# Patient Record
Sex: FEMALE | Race: WHITE | NOT HISPANIC OR LATINO | Employment: OTHER | URBAN - METROPOLITAN AREA
[De-identification: names, ages, dates, MRNs, and addresses within clinical notes are randomized per-mention and may not be internally consistent; named-entity substitution may affect disease eponyms.]

---

## 2022-08-01 ENCOUNTER — LAB REQUISITION (OUTPATIENT)
Dept: LAB | Facility: CLINIC | Age: 67
End: 2022-08-01
Payer: COMMERCIAL

## 2022-08-03 LAB
BASOPHILS # BLD AUTO: 0 10E3/UL (ref 0–0.2)
BASOPHILS NFR BLD AUTO: 0 %
EOSINOPHIL # BLD AUTO: 0.2 10E3/UL (ref 0–0.7)
EOSINOPHIL NFR BLD AUTO: 2 %
ERYTHROCYTE [DISTWIDTH] IN BLOOD BY AUTOMATED COUNT: 15.2 % (ref 10–15)
HCT VFR BLD AUTO: 38.8 % (ref 35–47)
HGB BLD-MCNC: 11.6 G/DL (ref 11.7–15.7)
IMM GRANULOCYTES # BLD: 0.1 10E3/UL
IMM GRANULOCYTES NFR BLD: 1 %
LYMPHOCYTES # BLD AUTO: 4.2 10E3/UL (ref 0.8–5.3)
LYMPHOCYTES NFR BLD AUTO: 36 %
MCH RBC QN AUTO: 27.1 PG (ref 26.5–33)
MCHC RBC AUTO-ENTMCNC: 29.9 G/DL (ref 31.5–36.5)
MCV RBC AUTO: 91 FL (ref 78–100)
MONOCYTES # BLD AUTO: 0.8 10E3/UL (ref 0–1.3)
MONOCYTES NFR BLD AUTO: 7 %
NEUTROPHILS # BLD AUTO: 6.3 10E3/UL (ref 1.6–8.3)
NEUTROPHILS NFR BLD AUTO: 54 %
NRBC # BLD AUTO: 0 10E3/UL
NRBC BLD AUTO-RTO: 0 /100
NT-PROBNP SERPL-MCNC: 339 PG/ML (ref 0–125)
PLATELET # BLD AUTO: 298 10E3/UL (ref 150–450)
RBC # BLD AUTO: 4.28 10E6/UL (ref 3.8–5.2)
WBC # BLD AUTO: 11.6 10E3/UL (ref 4–11)

## 2022-08-03 PROCEDURE — 36415 COLL VENOUS BLD VENIPUNCTURE: CPT | Mod: ORL | Performed by: NURSE PRACTITIONER

## 2022-08-03 PROCEDURE — 83880 ASSAY OF NATRIURETIC PEPTIDE: CPT | Mod: ORL | Performed by: NURSE PRACTITIONER

## 2022-08-03 PROCEDURE — P9603 ONE-WAY ALLOW PRORATED MILES: HCPCS | Mod: ORL | Performed by: NURSE PRACTITIONER

## 2022-08-03 PROCEDURE — 85025 COMPLETE CBC W/AUTO DIFF WBC: CPT | Mod: ORL | Performed by: NURSE PRACTITIONER

## 2022-08-11 ENCOUNTER — LAB REQUISITION (OUTPATIENT)
Dept: LAB | Facility: CLINIC | Age: 67
End: 2022-08-11
Payer: COMMERCIAL

## 2022-08-11 DIAGNOSIS — I61.5: ICD-10-CM

## 2022-08-11 DIAGNOSIS — E11.9 TYPE 2 DIABETES MELLITUS WITHOUT COMPLICATIONS (H): ICD-10-CM

## 2022-08-11 DIAGNOSIS — D64.9 ANEMIA, UNSPECIFIED: ICD-10-CM

## 2022-08-15 LAB
HBA1C MFR BLD: 6 %
IRON SERPL-MCNC: 37 UG/DL (ref 35–180)
TRANSFERRIN SERPL-MCNC: 191 MG/DL (ref 200–360)

## 2022-08-15 PROCEDURE — 36415 COLL VENOUS BLD VENIPUNCTURE: CPT | Mod: ORL | Performed by: GENERAL ACUTE CARE HOSPITAL

## 2022-08-15 PROCEDURE — 83540 ASSAY OF IRON: CPT | Mod: ORL | Performed by: GENERAL ACUTE CARE HOSPITAL

## 2022-08-15 PROCEDURE — P9603 ONE-WAY ALLOW PRORATED MILES: HCPCS | Mod: ORL | Performed by: GENERAL ACUTE CARE HOSPITAL

## 2022-08-15 PROCEDURE — 84466 ASSAY OF TRANSFERRIN: CPT | Mod: ORL | Performed by: GENERAL ACUTE CARE HOSPITAL

## 2022-08-15 PROCEDURE — 83036 HEMOGLOBIN GLYCOSYLATED A1C: CPT | Mod: ORL | Performed by: GENERAL ACUTE CARE HOSPITAL

## 2022-11-07 ENCOUNTER — LAB REQUISITION (OUTPATIENT)
Dept: LAB | Facility: CLINIC | Age: 67
End: 2022-11-07
Payer: COMMERCIAL

## 2022-11-07 DIAGNOSIS — S31.109D UNSPECIFIED OPEN WOUND OF ABDOMINAL WALL, UNSPECIFIED QUADRANT WITHOUT PENETRATION INTO PERITONEAL CAVITY, SUBSEQUENT ENCOUNTER: ICD-10-CM

## 2022-11-07 LAB
GRAM STAIN RESULT: NORMAL
GRAM STAIN RESULT: NORMAL

## 2022-11-07 PROCEDURE — 87077 CULTURE AEROBIC IDENTIFY: CPT | Mod: ORL | Performed by: PHYSICIAN ASSISTANT

## 2022-11-07 PROCEDURE — 87205 SMEAR GRAM STAIN: CPT | Mod: ORL | Performed by: PHYSICIAN ASSISTANT

## 2022-11-08 ENCOUNTER — HOSPITAL ENCOUNTER (EMERGENCY)
Facility: CLINIC | Age: 67
Discharge: HOME | End: 2022-11-08
Attending: EMERGENCY MEDICINE | Admitting: EMERGENCY MEDICINE
Payer: COMMERCIAL

## 2022-11-08 VITALS
SYSTOLIC BLOOD PRESSURE: 107 MMHG | HEIGHT: 67 IN | TEMPERATURE: 98.7 F | WEIGHT: 260 LBS | HEART RATE: 69 BPM | DIASTOLIC BLOOD PRESSURE: 72 MMHG | OXYGEN SATURATION: 95 % | RESPIRATION RATE: 18 BRPM | BODY MASS INDEX: 40.81 KG/M2

## 2022-11-08 DIAGNOSIS — R23.8 SKIN BREAKDOWN: ICD-10-CM

## 2022-11-08 DIAGNOSIS — K46.9 NON-RECURRENT ABDOMINAL HERNIA WITHOUT OBSTRUCTION OR GANGRENE, UNSPECIFIED HERNIA TYPE: ICD-10-CM

## 2022-11-08 LAB
ANION GAP SERPL CALCULATED.3IONS-SCNC: 7 MMOL/L (ref 3–14)
BUN SERPL-MCNC: 11 MG/DL (ref 7–30)
CALCIUM SERPL-MCNC: 9.3 MG/DL (ref 8.5–10.1)
CHLORIDE BLD-SCNC: 108 MMOL/L (ref 94–109)
CO2 SERPL-SCNC: 24 MMOL/L (ref 20–32)
CREAT SERPL-MCNC: 0.56 MG/DL (ref 0.52–1.04)
CRP SERPL-MCNC: 7.1 MG/L (ref 0–8)
ERYTHROCYTE [DISTWIDTH] IN BLOOD BY AUTOMATED COUNT: 14 % (ref 10–15)
ERYTHROCYTE [SEDIMENTATION RATE] IN BLOOD BY WESTERGREN METHOD: 20 MM/HR (ref 0–30)
GFR SERPL CREATININE-BSD FRML MDRD: >90 ML/MIN/1.73M2
GLUCOSE BLD-MCNC: 119 MG/DL (ref 70–99)
HCT VFR BLD AUTO: 42.5 % (ref 35–47)
HGB BLD-MCNC: 13.2 G/DL (ref 11.7–15.7)
LACTATE SERPL-SCNC: 1.3 MMOL/L (ref 0.7–2)
MCH RBC QN AUTO: 26.5 PG (ref 26.5–33)
MCHC RBC AUTO-ENTMCNC: 31.1 G/DL (ref 31.5–36.5)
MCV RBC AUTO: 85 FL (ref 78–100)
PLATELET # BLD AUTO: 231 10E3/UL (ref 150–450)
POTASSIUM BLD-SCNC: 4.7 MMOL/L (ref 3.4–5.3)
RBC # BLD AUTO: 4.99 10E6/UL (ref 3.8–5.2)
SODIUM SERPL-SCNC: 139 MMOL/L (ref 133–144)
WBC # BLD AUTO: 8.4 10E3/UL (ref 4–11)

## 2022-11-08 PROCEDURE — 86140 C-REACTIVE PROTEIN: CPT | Performed by: EMERGENCY MEDICINE

## 2022-11-08 PROCEDURE — 80048 BASIC METABOLIC PNL TOTAL CA: CPT | Performed by: EMERGENCY MEDICINE

## 2022-11-08 PROCEDURE — 85027 COMPLETE CBC AUTOMATED: CPT | Performed by: EMERGENCY MEDICINE

## 2022-11-08 PROCEDURE — 85652 RBC SED RATE AUTOMATED: CPT | Performed by: EMERGENCY MEDICINE

## 2022-11-08 PROCEDURE — 36415 COLL VENOUS BLD VENIPUNCTURE: CPT | Performed by: EMERGENCY MEDICINE

## 2022-11-08 PROCEDURE — 99283 EMERGENCY DEPT VISIT LOW MDM: CPT

## 2022-11-08 PROCEDURE — 83605 ASSAY OF LACTIC ACID: CPT | Performed by: EMERGENCY MEDICINE

## 2022-11-08 ASSESSMENT — ENCOUNTER SYMPTOMS
ABDOMINAL PAIN: 0
CONSTIPATION: 0
WOUND: 1
APPETITE CHANGE: 0
DIARRHEA: 0
ABDOMINAL DISTENTION: 1

## 2022-11-08 ASSESSMENT — ACTIVITIES OF DAILY LIVING (ADL): ADLS_ACUITY_SCORE: 35

## 2022-11-08 NOTE — ED TRIAGE NOTES
Pt BIBA from  for evaluation of wound on abd. . Patient denies pain.      Triage Assessment     Row Name 11/08/22 1130       Respiratory WDL    Respiratory WDL rhythm/pattern    Rhythm/Pattern, Respiratory no shortness of breath reported       Skin Circulation/Temperature WDL    Skin Circulation/Temperature WDL temperature  wound to abdomen, pink, moist, covered    Skin Temperature warm       Cardiac WDL    Cardiac WDL rhythm    Pulse Rate & Regularity radial pulse regular       Peripheral/Neurovascular WDL    Peripheral Neurovascular WDL WDL       Cognitive/Neuro/Behavioral WDL    Cognitive/Neuro/Behavioral WDL X  forgetful

## 2022-11-08 NOTE — DISCHARGE INSTRUCTIONS
As discussed with Jessica, the LPN, Ms. Castillo has a chronic skin breakdown to her abdominal wall.  This would most benefit from a wound care consult and appropriate wet-to-dry dressings.  There is no indication for antibiotics at this time.  Return to the emergency department at any point for fever, surrounding redness, tenderness, or any other emergent concerns.

## 2022-11-08 NOTE — ED PROVIDER NOTES
History   Chief Complaint:  Wound Check       The history is provided by the patient.      Estephania Castillo is a 67 year old female with history of diabetes mellitus and hypertension who presents from her group home for evaluation of a wound to the center of her abdomen. I talked with HASEEB Lopez, from the patient's facility. She reports that the patient has been living there for about 6 months. There was a wound about the size of a quarter when she arrived. It has slowly and progressively increased in size over these past 6 months. They contacted the primary yesterday regarding this wound. Blood was drawn and culture done yesterday, but her primary wanted her to be evaluated today in the emergency department because they cannot see her until next week. Jessica also reports that the patient has a chronic hernia. She denies any issues with eating. The patient denies any abdominal pain or changes to her bowels.     Review of Systems   Constitutional: Negative for appetite change.   Gastrointestinal: Positive for abdominal distention. Negative for abdominal pain, constipation and diarrhea.   Skin: Positive for wound.   All other systems reviewed and are negative.      Allergies:  Sulfa antibiotics  Bupropion     Medications:  Symbicort   Aldactone  Spiriva handihaler  Metformin  Atorvastatin  Albuterol  Umeclidinium     Past Medical History:     Vascular insufficiency of limb  SUBHA  COPD  Hypertension  Tobacco use disorder  Asthma  Diabetes mellitus  UTI  Hypernatremia  Delirium  Encephalopathy  Intracranial hemorrhage    Past Surgical History:    Appendectomy  Strabismus surgery  Hammer toe procedure      Family History:    Cerebrovascular disease  Coronary artery disease  Type 2 diabetes mellitus  Asthma  Lung cancer  Osteoporosis  PE    Social History:  The patient presents to the ED alone  Living Situation: resides in a group home     Physical Exam     Patient Vitals for the past 24 hrs:   BP Temp Temp src Pulse  "Resp SpO2 Height Weight   11/08/22 1306 107/72 -- -- 69 18 95 % -- --   11/08/22 1126 111/77 98.7  F (37.1  C) Oral 74 20 97 % 1.702 m (5' 7\") 117.9 kg (260 lb)       Physical Exam  Eye:  Pupils are equal, round, and reactive.  Extraocular movements intact.    ENT:  No rhinorrhea.  Moist mucus membranes.  Normal tongue and tonsil.    Cardiac:  Regular rate and rhythm.  No murmurs, gallops, or rubs.    Pulmonary:  Clear to auscultation bilaterally.  No wheezes, rales, or rhonchi.    Abdomen:  Positive bowel sounds.  Abdomen is soft and non-distended, without focal tenderness.  There is a large periumbilical hernia that is easily reducible.     Musculoskeletal:  Normal movement of all extremities without evidence for deficit.    Skin:  Warm and dry without rashes.  There is a large (10 cm by 5 cm) open wound to the infraumbilical area at site of prior G tube.  Underlying tissue is pink, with granulation tissue.  No surrounding redness, warmth, or tenderness.  No drainage or fluctuance noted.     Neurologic:  Non-focal exam without asymmetric weakness or numbness.     Psychiatric:  Normal affect with appropriate interaction with examiner.  Patient is pleasantly confused, at baseline per chart.       Emergency Department Course     Laboratory:  Labs Ordered and Resulted from Time of ED Arrival to Time of ED Departure   BASIC METABOLIC PANEL - Abnormal       Result Value    Sodium 139      Potassium 4.7      Chloride 108      Carbon Dioxide (CO2) 24      Anion Gap 7      Urea Nitrogen 11      Creatinine 0.56      Calcium 9.3      Glucose 119 (*)     GFR Estimate >90     CBC WITH PLATELETS - Abnormal    WBC Count 8.4      RBC Count 4.99      Hemoglobin 13.2      Hematocrit 42.5      MCV 85      MCH 26.5      MCHC 31.1 (*)     RDW 14.0      Platelet Count 231     LACTIC ACID WHOLE BLOOD - Normal    Lactic Acid 1.3     CRP INFLAMMATION - Normal    CRP Inflammation 7.1     ERYTHROCYTE SEDIMENTATION RATE AUTO - Normal    " Erythrocyte Sedimentation Rate 20          Emergency Department Course:     Reviewed:  I reviewed nursing notes, vitals, past medical history and Care Everywhere    Assessments:  1223 I obtained history and examined the patient as noted above.   1232 I called HASEEB Lopez, from the patient's facility to obtain further history and updates.   1240 At this point I feel that the patient is safe for discharge, and the patient and facility agree.    Consults:  1230 Page out to [facility], who follow patient.   1631 I spoke with [facility] regarding the patient and updates.     Disposition:  The patient was discharged to home.     Impression & Plan     Medical Decision Making:  This unfortunate 67-year-old woman with prior stroke and encephalopathy presents from her group home for evaluation of a wound on her abdomen.  She used to have a G-tube in place.  She has been at the facility for approximately 6 months and had a chronic wound in this area when she arrived.  She saw wound care for a while and it seemed to be improving.  However, over the past month, it is gotten worse again.  When the staff removed a dressing yesterday, they thought that it appeared to be draining some green material and was foul-smelling.  This prompted lab work and a culture yesterday.  When symptoms were ongoing today, the [facility] physician requested the patient be transferred to the ER for investigation and possible IV antibiotics and admission.    On my exam, the patient appears clinically well.  She has no abdominal discomfort.  There is an underlying hernia but I do not believe this is germane to her issue today as this is clearly chronic and is easily reducible.  The wound appears to be more consistent with skin breakdown, almost like a pressure type of an injury.  The underlying tissue is pink and vibrant.  There is no surrounding redness or warmth.  There is no drainage.  There is no evidence for an infectious process.   Furthermore, laboratory investigation is reassuring, showing a normal white blood cell count and normal inflammatory markers.  Her lactate and vital signs are otherwise normal.    At this, I spoke with both the LPN at the facility and eventually with the physician that evaluates these patients.  I explained that there is no indication for admission at this time.  What she needs is a wound care consult to appropriately manage this wound.  I have to wonder if there may be some degree of a allergic phenomenon as they are keeping it covered and placing different ointments on it.  In the end, the patient was transferred back in stable condition.  I spoke with physician who will put in for a wound consult and they will monitor this closely.  They were invited back to our facility at any point for fever, pain, or other emergent concerns.      Diagnosis:    ICD-10-CM    1. Skin breakdown  R23.8     Chronic abdominal wound      2. Non-recurrent abdominal hernia without obstruction or gangrene, unspecified hernia type  K46.9         Scribe Disclosure:  I, Apple Nicole, am serving as a scribe at 11:34 AM on 11/8/2022 to document services personally performed by Trierweiler, Chad A, MD based on my observations and the provider's statements to me.        Trierweiler, Chad A, MD  11/09/22 5610

## 2022-11-10 LAB
BACTERIA WND CULT: ABNORMAL
BACTERIA WND CULT: ABNORMAL

## 2023-01-30 ENCOUNTER — LAB REQUISITION (OUTPATIENT)
Dept: LAB | Facility: CLINIC | Age: 68
End: 2023-01-30
Payer: COMMERCIAL

## 2023-01-30 DIAGNOSIS — S31.109D UNSPECIFIED OPEN WOUND OF ABDOMINAL WALL, UNSPECIFIED QUADRANT WITHOUT PENETRATION INTO PERITONEAL CAVITY, SUBSEQUENT ENCOUNTER: ICD-10-CM

## 2023-01-30 PROCEDURE — 87186 SC STD MICRODIL/AGAR DIL: CPT | Mod: ORL | Performed by: PHYSICIAN ASSISTANT

## 2023-02-01 LAB
BACTERIA WND CULT: ABNORMAL

## 2023-04-04 ENCOUNTER — HOSPITAL ENCOUNTER (INPATIENT)
Facility: CLINIC | Age: 68
LOS: 2 days | Discharge: NURSING, REHAB OR LONG-TERM CARE | DRG: 372 | End: 2023-04-07
Attending: EMERGENCY MEDICINE | Admitting: INTERNAL MEDICINE
Payer: COMMERCIAL

## 2023-04-04 DIAGNOSIS — A04.72 C. DIFFICILE COLITIS: Primary | ICD-10-CM

## 2023-04-04 DIAGNOSIS — J96.11 CHRONIC RESPIRATORY FAILURE WITH HYPOXIA (H): ICD-10-CM

## 2023-04-04 DIAGNOSIS — E86.0 DEHYDRATION: ICD-10-CM

## 2023-04-04 DIAGNOSIS — R19.7 DIARRHEA, UNSPECIFIED TYPE: ICD-10-CM

## 2023-04-04 LAB
ALBUMIN UR-MCNC: NEGATIVE MG/DL
ANION GAP SERPL CALCULATED.3IONS-SCNC: 4 MMOL/L (ref 7–15)
APPEARANCE UR: CLEAR
BILIRUB UR QL STRIP: NEGATIVE
BUN SERPL-MCNC: 9.4 MG/DL (ref 8–23)
CA-I BLD-MCNC: 4.3 MG/DL (ref 4.4–5.2)
CALCIUM SERPL-MCNC: 7.7 MG/DL (ref 8.8–10.2)
CHLORIDE SERPL-SCNC: 105 MMOL/L (ref 98–107)
COLOR UR AUTO: YELLOW
CREAT SERPL-MCNC: 0.7 MG/DL (ref 0.51–0.95)
DEPRECATED HCO3 PLAS-SCNC: 29 MMOL/L (ref 22–29)
ERYTHROCYTE [DISTWIDTH] IN BLOOD BY AUTOMATED COUNT: 14.6 % (ref 10–15)
GFR SERPL CREATININE-BSD FRML MDRD: >90 ML/MIN/1.73M2
GLUCOSE SERPL-MCNC: 81 MG/DL (ref 70–99)
GLUCOSE UR STRIP-MCNC: NEGATIVE MG/DL
HCT VFR BLD AUTO: 37.5 % (ref 35–47)
HGB BLD-MCNC: 12.2 G/DL (ref 11.7–15.7)
HGB UR QL STRIP: ABNORMAL
HOLD SPECIMEN: NORMAL
KETONES UR STRIP-MCNC: NEGATIVE MG/DL
LACTATE SERPL-SCNC: 0.5 MMOL/L (ref 0.7–2)
LEUKOCYTE ESTERASE UR QL STRIP: NEGATIVE
MAGNESIUM SERPL-MCNC: 1.8 MG/DL (ref 1.7–2.3)
MCH RBC QN AUTO: 27.5 PG (ref 26.5–33)
MCHC RBC AUTO-ENTMCNC: 32.5 G/DL (ref 31.5–36.5)
MCV RBC AUTO: 85 FL (ref 78–100)
MUCOUS THREADS #/AREA URNS LPF: PRESENT /LPF
NITRATE UR QL: NEGATIVE
PH UR STRIP: 5.5 [PH] (ref 5–7)
PLATELET # BLD AUTO: 376 10E3/UL (ref 150–450)
POTASSIUM SERPL-SCNC: 3.6 MMOL/L (ref 3.4–5.3)
RBC # BLD AUTO: 4.43 10E6/UL (ref 3.8–5.2)
RBC URINE: 84 /HPF
SODIUM SERPL-SCNC: 138 MMOL/L (ref 136–145)
SP GR UR STRIP: 1.02 (ref 1–1.03)
SQUAMOUS EPITHELIAL: <1 /HPF
UROBILINOGEN UR STRIP-MCNC: NORMAL MG/DL
WBC # BLD AUTO: 11.3 10E3/UL (ref 4–11)
WBC URINE: 5 /HPF

## 2023-04-04 PROCEDURE — 96360 HYDRATION IV INFUSION INIT: CPT

## 2023-04-04 PROCEDURE — 87324 CLOSTRIDIUM AG IA: CPT | Performed by: EMERGENCY MEDICINE

## 2023-04-04 PROCEDURE — G0378 HOSPITAL OBSERVATION PER HR: HCPCS

## 2023-04-04 PROCEDURE — 99223 1ST HOSP IP/OBS HIGH 75: CPT | Mod: AI | Performed by: INTERNAL MEDICINE

## 2023-04-04 PROCEDURE — 87506 IADNA-DNA/RNA PROBE TQ 6-11: CPT | Performed by: EMERGENCY MEDICINE

## 2023-04-04 PROCEDURE — 83735 ASSAY OF MAGNESIUM: CPT | Performed by: EMERGENCY MEDICINE

## 2023-04-04 PROCEDURE — 80048 BASIC METABOLIC PNL TOTAL CA: CPT | Performed by: EMERGENCY MEDICINE

## 2023-04-04 PROCEDURE — 99285 EMERGENCY DEPT VISIT HI MDM: CPT

## 2023-04-04 PROCEDURE — 85027 COMPLETE CBC AUTOMATED: CPT | Performed by: EMERGENCY MEDICINE

## 2023-04-04 PROCEDURE — 81001 URINALYSIS AUTO W/SCOPE: CPT | Performed by: EMERGENCY MEDICINE

## 2023-04-04 PROCEDURE — 258N000003 HC RX IP 258 OP 636: Performed by: EMERGENCY MEDICINE

## 2023-04-04 PROCEDURE — 96361 HYDRATE IV INFUSION ADD-ON: CPT

## 2023-04-04 PROCEDURE — 83605 ASSAY OF LACTIC ACID: CPT | Performed by: EMERGENCY MEDICINE

## 2023-04-04 PROCEDURE — 82330 ASSAY OF CALCIUM: CPT | Performed by: EMERGENCY MEDICINE

## 2023-04-04 PROCEDURE — 36415 COLL VENOUS BLD VENIPUNCTURE: CPT | Performed by: EMERGENCY MEDICINE

## 2023-04-04 PROCEDURE — 87493 C DIFF AMPLIFIED PROBE: CPT | Performed by: EMERGENCY MEDICINE

## 2023-04-04 RX ORDER — PROCHLORPERAZINE 25 MG
12.5 SUPPOSITORY, RECTAL RECTAL EVERY 12 HOURS PRN
Status: DISCONTINUED | OUTPATIENT
Start: 2023-04-04 | End: 2023-04-07 | Stop reason: HOSPADM

## 2023-04-04 RX ORDER — ONDANSETRON 2 MG/ML
4 INJECTION INTRAMUSCULAR; INTRAVENOUS EVERY 6 HOURS PRN
Status: DISCONTINUED | OUTPATIENT
Start: 2023-04-04 | End: 2023-04-07 | Stop reason: HOSPADM

## 2023-04-04 RX ORDER — SODIUM CHLORIDE 9 MG/ML
INJECTION, SOLUTION INTRAVENOUS CONTINUOUS
Status: DISCONTINUED | OUTPATIENT
Start: 2023-04-04 | End: 2023-04-04

## 2023-04-04 RX ORDER — ONDANSETRON 4 MG/1
4 TABLET, ORALLY DISINTEGRATING ORAL EVERY 6 HOURS PRN
Status: DISCONTINUED | OUTPATIENT
Start: 2023-04-04 | End: 2023-04-07 | Stop reason: HOSPADM

## 2023-04-04 RX ORDER — MULTIVITAMIN
1 TABLET ORAL DAILY
COMMUNITY

## 2023-04-04 RX ORDER — PROCHLORPERAZINE MALEATE 5 MG
5 TABLET ORAL EVERY 6 HOURS PRN
Status: DISCONTINUED | OUTPATIENT
Start: 2023-04-04 | End: 2023-04-07 | Stop reason: HOSPADM

## 2023-04-04 RX ORDER — ALBUTEROL SULFATE 90 UG/1
1 AEROSOL, METERED RESPIRATORY (INHALATION) EVERY 6 HOURS PRN
COMMUNITY

## 2023-04-04 RX ORDER — ACETAMINOPHEN 650 MG/1
650 SUPPOSITORY RECTAL EVERY 6 HOURS PRN
Status: DISCONTINUED | OUTPATIENT
Start: 2023-04-04 | End: 2023-04-07 | Stop reason: HOSPADM

## 2023-04-04 RX ORDER — HYDROXYZINE HYDROCHLORIDE 50 MG/1
50 TABLET, FILM COATED ORAL EVERY 6 HOURS PRN
COMMUNITY
Start: 2023-02-16

## 2023-04-04 RX ORDER — FLUTICASONE PROPIONATE AND SALMETEROL 500; 50 UG/1; UG/1
1 POWDER RESPIRATORY (INHALATION) EVERY 12 HOURS
COMMUNITY
Start: 2022-10-11 | End: 2023-10-05

## 2023-04-04 RX ORDER — ACETAMINOPHEN 325 MG/1
650 TABLET ORAL EVERY 6 HOURS PRN
Status: DISCONTINUED | OUTPATIENT
Start: 2023-04-04 | End: 2023-04-07 | Stop reason: HOSPADM

## 2023-04-04 RX ORDER — ATORVASTATIN CALCIUM 40 MG/1
40 TABLET, FILM COATED ORAL DAILY
COMMUNITY
Start: 2022-07-22

## 2023-04-04 RX ORDER — POLYVINYL ALCOHOL 14 MG/ML
1 SOLUTION/ DROPS OPHTHALMIC 4 TIMES DAILY
COMMUNITY
Start: 2023-03-08

## 2023-04-04 RX ORDER — FLUOXETINE 10 MG/1
10 CAPSULE ORAL EVERY MORNING
COMMUNITY
Start: 2022-09-23

## 2023-04-04 RX ORDER — POTASSIUM CHLORIDE 1.5 G/1.58G
20 POWDER, FOR SOLUTION ORAL 2 TIMES DAILY WITH MEALS
COMMUNITY
Start: 2023-04-01

## 2023-04-04 RX ORDER — ACETAMINOPHEN 325 MG/1
650 TABLET ORAL EVERY 6 HOURS PRN
Status: ON HOLD | COMMUNITY
End: 2023-08-28

## 2023-04-04 RX ORDER — FLUTICASONE PROPIONATE 50 MCG
1 SPRAY, SUSPENSION (ML) NASAL EVERY MORNING
COMMUNITY
Start: 2023-03-30

## 2023-04-04 RX ORDER — GUAIFENESIN AND DEXTROMETHORPHAN HYDROBROMIDE 1200; 60 MG/1; MG/1
1 TABLET, EXTENDED RELEASE ORAL 2 TIMES DAILY PRN
COMMUNITY
Start: 2023-02-28

## 2023-04-04 RX ORDER — AMMONIUM LACTATE 12 G/100G
LOTION TOPICAL DAILY
COMMUNITY
Start: 2022-09-30

## 2023-04-04 RX ORDER — SODIUM CHLORIDE 9 MG/ML
INJECTION, SOLUTION INTRAVENOUS CONTINUOUS
Status: DISCONTINUED | OUTPATIENT
Start: 2023-04-04 | End: 2023-04-07 | Stop reason: HOSPADM

## 2023-04-04 RX ORDER — FLUOXETINE 20 MG/1
20 TABLET, FILM COATED ORAL EVERY MORNING
COMMUNITY
Start: 2023-02-10

## 2023-04-04 RX ADMIN — SODIUM CHLORIDE: 9 INJECTION, SOLUTION INTRAVENOUS at 21:34

## 2023-04-04 RX ADMIN — SODIUM CHLORIDE 1000 ML: 9 INJECTION, SOLUTION INTRAVENOUS at 20:07

## 2023-04-04 ASSESSMENT — ACTIVITIES OF DAILY LIVING (ADL)
ADLS_ACUITY_SCORE: 35

## 2023-04-04 NOTE — ED TRIAGE NOTES
Patient BIBA after  staff noticed blood pressure was 90's/60's and patient is still weak. Patient was just discharge from the hospital for weakness and was diagnosed with C-diff. BG 86. Patient denies pain, N/V, and fever. Is having diarrhea. When patient was in the hospital patient was hypoxic and has been using oxygen at home.

## 2023-04-04 NOTE — ED PROVIDER NOTES
History   Chief Complaint:  Generalized Weakness and Hypotension     The history is provided by the patient, a relative and medical records.      Estephania Castillo is a 67 year old female with a history of type 2 diabetes mellitus, pulmonary hypertension, hyperlipidemia, COPD, and intracranial hemorrhage who presents via EMS from [facility] with generalized weakness and hypotension. The patient states she was recently diagnosed with C. difficile during her hospital admission on 03/27. She states she was prescribed antibiotics at time of discharge. She has had continued diarrhea since her discharge. [facility] staff noted the patient was hypotensive in the 90s/60s today, and patient was generally weak, so EMS was called. The patient denies hematochezia or melena. She denies any pain currently. She denies fevers, rhinorrhea, or congestion. She has a chronic cough. She denies headaches. She denies chest pain or shortness of breath. She denies new numbness or weakness to her extremities. She has been on supplemental oxygen intermittently at home for COPD. She denies personal cardiac history. She was recently on two different antibiotics, per the patient. She denies tobacco, alcohol, or illicit drug use. The patient is unable to ambulate on her own at baseline due to history of intracranial hemorrhage.     Independent Historian:   Sibling - They report the patient was not prescribed antibiotics at time of hospital discharge. They are the patient's guardian as well.    Review of External Notes: I reviewed the patient's discharge summary from her hospitalization from 03/27-04/01/2023 for chronic diarrhea. Patient was noted to have C. difficile without active infection. She was discharged with metamucil, colestipol, and potassium supplements.     ROS:  Review of Systems   All other systems reviewed and are negative.    Allergies:  Sulfa Drugs   Bupropion     Medications:    Albuterol inhaler   Lipitor   Colestid   Prozac    Atarax   Robaxin     Past Medical History:    Vascular insufficiency of limb  Obstructive sleep apnea  Morbid obesity   COPD  Asthma   Hypertension  Hyperlipidemia   Diabetes mellitus, type 2  Secondary pulmonary hypertension   Intracranial hemorrhage     Past Surgical History:    Appendectomy  Craniotomy  Exploratory laparotomy    Strabismus surgery     Family History:    Mother: Asthma, Lung Cancer, Macular Degeneration, Osteoporosis   Father: Coronary Artery Disease, Diabetes Mellitus, Dilated Cardiomyopathy, Stroke   Sister: Asthma, Retinal Detachment     Social History:  The patient arrived to the emergency department via EMS.  The patient was accompanied to the emergency department by her sister.  PCP: No Ref-Primary, Physician     Physical Exam     Patient Vitals for the past 24 hrs:   BP Temp Temp src Pulse Resp SpO2   04/04/23 2130 106/54 -- -- 77 -- 99 %   04/04/23 2106 97/52 -- -- -- -- 98 %   04/04/23 2027 107/45 -- -- 88 -- 95 %   04/04/23 1904 93/54 -- -- 78 -- 98 %   04/04/23 1752 98/59 99.6  F (37.6  C) Oral 80 18 (!) 88 %      Physical Exam  General: Resting on the bed.  Head: No obvious trauma to head.  Ears, Nose, Throat:  External ears normal.  Nose normal.  Eyes:  Conjunctivae clear.  Pupils are equal, round, and reactive.   CV: Regular rate and rhythm.  No murmurs.      Respiratory: Effort normal and breath sounds normal.  No wheezing or crackles.   Gastrointestinal: Soft.  No distension. There is no tenderness.  There is no rigidity, no rebound and no guarding.   Neuro: Alert. Moving all extremities appropriately.  Normal speech.  Follows commands.  Symmetric smile.   5 out of 5.  Able to wiggle toes.  Sensation intact to light touch.  Skin: Skin is warm and dry.  No rash noted.       Emergency Department Course   Laboratory:  Labs Ordered and Resulted from Time of ED Arrival to Time of ED Departure   ROUTINE UA WITH MICROSCOPIC REFLEX TO CULTURE - Abnormal       Result Value    Color  Urine Yellow      Appearance Urine Clear      Glucose Urine Negative      Bilirubin Urine Negative      Ketones Urine Negative      Specific Gravity Urine 1.022      Blood Urine Small (*)     pH Urine 5.5      Protein Albumin Urine Negative      Urobilinogen Urine Normal      Nitrite Urine Negative      Leukocyte Esterase Urine Negative      Mucus Urine Present (*)     RBC Urine 84 (*)     WBC Urine 5      Squamous Epithelials Urine <1     CBC WITH PLATELETS - Abnormal    WBC Count 11.3 (*)     RBC Count 4.43      Hemoglobin 12.2      Hematocrit 37.5      MCV 85      MCH 27.5      MCHC 32.5      RDW 14.6      Platelet Count 376     BASIC METABOLIC PANEL - Abnormal    Sodium 138      Potassium 3.6      Chloride 105      Carbon Dioxide (CO2) 29      Anion Gap 4 (*)     Urea Nitrogen 9.4      Creatinine 0.70      Calcium 7.7 (*)     Glucose 81      GFR Estimate >90     LACTIC ACID WHOLE BLOOD - Abnormal    Lactic Acid 0.5 (*)    IONIZED CALCIUM - Abnormal    Calcium Ionized 4.3 (*)    MAGNESIUM - Normal    Magnesium 1.8     ENTERIC BACTERIA AND VIRUS PANEL BY NAYE STOOL   C. DIFFICILE TOXIN B PCR WITH REFLEX TO C. DIFFICILE ANTIGEN AND TOXINS A/B EIA      Emergency Department Course & Assessments:     Interventions:  Medications   sodium chloride 0.9% infusion ( Intravenous $New Bag 4/4/23 2134)   0.9% sodium chloride BOLUS (0 mLs Intravenous Stopped 4/4/23 2116)      Assessments:  1941 I obtained history and performed an exam of the patient as documented above.  2122 I rechecked the patient and explained findings. Discussed plan of care.     Independent Interpretation (X-rays, CTs, rhythm strip):  None    Consultations/Discussion of Management or Tests:  2135 I spoke with Dr. Gonzales of the hospitalist service who accepts the patient for admission.     Social Determinants of Health affecting care:   Healthcare Access/Compliance patient is in a group home and does have a history of being bedbound which likely impacts her  ability to seek and obtain care.    Disposition:  The patient was admitted to the hospital under the care of Dr. Gonzales.     Impression & Plan    Medical Decision Makin-year-old female presents to the ER with diarrhea and concern for dehydration.  Softer blood pressures at home although stable blood pressures here.  It does appear that her baseline blood pressures in the 120s and her blood pressures here been 90s to 100s.  Broad differentials pursued include not limited to dehydration, electrolyte, metabolic, renal dysfunction, colitis, diverticulitis, obstruction, perforation, etc.  CBC without significant leukocytosis or anemia.  BMP without acute electrolyte, metabolic or renal dysfunction.  Ionized calcium was obtained given low calcium on BMP but this is essentially normal.  Magnesium level is normal.  UA with blood likely from straight cath as patient is otherwise no symptoms.  No signs of infection.  Stool sample ordered for both enteric panel as well as C. difficile.  Patient was given 1 L of fluids.  She has a benign abdomen.  I do not see evidence of acute surgical process.  I do not see indication for CT scan.  After the fluids pressures continue to improve to the low 100s.  Patient and family did not feel comfortable going home as she has limited access to care at home and limited assistance.  At this point I think that we will be admitted for observation.    Diagnosis:    ICD-10-CM    1. Dehydration  E86.0       2. Diarrhea, unspecified type  R19.7          Scribe Disclosure:  I, Ashley Wisdom, am serving as a scribe at 6:56 PM on 2023 to document services personally performed by Raina Haq MD based on my observations and the provider's statements to me.      Raina Haq MD  [phone]

## 2023-04-05 PROBLEM — A04.72 C. DIFFICILE COLITIS: Status: ACTIVE | Noted: 2023-04-05

## 2023-04-05 LAB
ANION GAP SERPL CALCULATED.3IONS-SCNC: 5 MMOL/L (ref 7–15)
BUN SERPL-MCNC: 8.1 MG/DL (ref 8–23)
C COLI+JEJUNI+LARI FUSA STL QL NAA+PROBE: NOT DETECTED
C DIFF GDH STL QL IA: POSITIVE
C DIFF TOX A+B STL QL IA: POSITIVE
C DIFF TOX B STL QL: POSITIVE
CALCIUM SERPL-MCNC: 7.5 MG/DL (ref 8.8–10.2)
CHLORIDE SERPL-SCNC: 108 MMOL/L (ref 98–107)
CREAT SERPL-MCNC: 0.59 MG/DL (ref 0.51–0.95)
DEPRECATED HCO3 PLAS-SCNC: 26 MMOL/L (ref 22–29)
EC STX1 GENE STL QL NAA+PROBE: NOT DETECTED
EC STX2 GENE STL QL NAA+PROBE: NOT DETECTED
GFR SERPL CREATININE-BSD FRML MDRD: >90 ML/MIN/1.73M2
GLUCOSE SERPL-MCNC: 78 MG/DL (ref 70–99)
NOROV GI+II ORF1-ORF2 JNC STL QL NAA+PR: NOT DETECTED
POTASSIUM SERPL-SCNC: 3.5 MMOL/L (ref 3.4–5.3)
RVA NSP5 STL QL NAA+PROBE: NOT DETECTED
SALMONELLA SP RPOD STL QL NAA+PROBE: NOT DETECTED
SHIGELLA SP+EIEC IPAH STL QL NAA+PROBE: NOT DETECTED
SODIUM SERPL-SCNC: 139 MMOL/L (ref 136–145)
V CHOL+PARA RFBL+TRKH+TNAA STL QL NAA+PR: NOT DETECTED
Y ENTERO RECN STL QL NAA+PROBE: NOT DETECTED

## 2023-04-05 PROCEDURE — 87328 CRYPTOSPORIDIUM AG IA: CPT | Performed by: SPECIALIST

## 2023-04-05 PROCEDURE — 99222 1ST HOSP IP/OBS MODERATE 55: CPT | Performed by: SPECIALIST

## 2023-04-05 PROCEDURE — 99232 SBSQ HOSP IP/OBS MODERATE 35: CPT | Performed by: PHYSICIAN ASSISTANT

## 2023-04-05 PROCEDURE — 258N000003 HC RX IP 258 OP 636: Performed by: INTERNAL MEDICINE

## 2023-04-05 PROCEDURE — 250N000013 HC RX MED GY IP 250 OP 250 PS 637: Performed by: PHYSICIAN ASSISTANT

## 2023-04-05 PROCEDURE — 250N000013 HC RX MED GY IP 250 OP 250 PS 637: Performed by: INTERNAL MEDICINE

## 2023-04-05 PROCEDURE — G0378 HOSPITAL OBSERVATION PER HR: HCPCS

## 2023-04-05 PROCEDURE — 80048 BASIC METABOLIC PNL TOTAL CA: CPT | Performed by: INTERNAL MEDICINE

## 2023-04-05 PROCEDURE — 120N000001 HC R&B MED SURG/OB

## 2023-04-05 PROCEDURE — 96361 HYDRATE IV INFUSION ADD-ON: CPT

## 2023-04-05 PROCEDURE — 36415 COLL VENOUS BLD VENIPUNCTURE: CPT | Performed by: INTERNAL MEDICINE

## 2023-04-05 PROCEDURE — 87329 GIARDIA AG IA: CPT | Performed by: SPECIALIST

## 2023-04-05 RX ORDER — CARBOXYMETHYLCELLULOSE SODIUM 5 MG/ML
1 SOLUTION/ DROPS OPHTHALMIC 4 TIMES DAILY
Status: DISCONTINUED | OUTPATIENT
Start: 2023-04-05 | End: 2023-04-07 | Stop reason: HOSPADM

## 2023-04-05 RX ORDER — FLUOXETINE 10 MG/1
30 CAPSULE ORAL EVERY MORNING
Status: DISCONTINUED | OUTPATIENT
Start: 2023-04-05 | End: 2023-04-07 | Stop reason: HOSPADM

## 2023-04-05 RX ORDER — POTASSIUM CHLORIDE 1.5 G/1.58G
20 POWDER, FOR SOLUTION ORAL 2 TIMES DAILY WITH MEALS
Status: DISCONTINUED | OUTPATIENT
Start: 2023-04-05 | End: 2023-04-07 | Stop reason: HOSPADM

## 2023-04-05 RX ORDER — VANCOMYCIN HYDROCHLORIDE 125 MG/1
125 CAPSULE ORAL 4 TIMES DAILY
Status: DISCONTINUED | OUTPATIENT
Start: 2023-04-05 | End: 2023-04-07 | Stop reason: HOSPADM

## 2023-04-05 RX ORDER — HYDROXYZINE HYDROCHLORIDE 25 MG/1
50 TABLET, FILM COATED ORAL EVERY 6 HOURS PRN
Status: DISCONTINUED | OUTPATIENT
Start: 2023-04-05 | End: 2023-04-07 | Stop reason: HOSPADM

## 2023-04-05 RX ORDER — FLUOXETINE 20 MG/1
20 TABLET, FILM COATED ORAL EVERY MORNING
Status: DISCONTINUED | OUTPATIENT
Start: 2023-04-05 | End: 2023-04-05

## 2023-04-05 RX ORDER — POLYVINYL ALCOHOL 14 MG/ML
1 SOLUTION/ DROPS OPHTHALMIC 4 TIMES DAILY
Status: DISCONTINUED | OUTPATIENT
Start: 2023-04-05 | End: 2023-04-05

## 2023-04-05 RX ADMIN — VANCOMYCIN HYDROCHLORIDE 125 MG: 125 CAPSULE ORAL at 20:34

## 2023-04-05 RX ADMIN — VANCOMYCIN HYDROCHLORIDE 125 MG: 125 CAPSULE ORAL at 12:33

## 2023-04-05 RX ADMIN — CARBOXYMETHYLCELLULOSE SODIUM 1 DROP: 5 SOLUTION/ DROPS OPHTHALMIC at 20:34

## 2023-04-05 RX ADMIN — POTASSIUM CHLORIDE 20 MEQ: 1.5 POWDER, FOR SOLUTION ORAL at 12:33

## 2023-04-05 RX ADMIN — CARBOXYMETHYLCELLULOSE SODIUM 1 DROP: 5 SOLUTION/ DROPS OPHTHALMIC at 15:52

## 2023-04-05 RX ADMIN — VANCOMYCIN HYDROCHLORIDE 125 MG: 125 CAPSULE ORAL at 15:52

## 2023-04-05 RX ADMIN — SODIUM CHLORIDE: 9 INJECTION, SOLUTION INTRAVENOUS at 16:13

## 2023-04-05 RX ADMIN — VANCOMYCIN HYDROCHLORIDE 125 MG: 125 CAPSULE ORAL at 09:16

## 2023-04-05 RX ADMIN — POTASSIUM CHLORIDE 20 MEQ: 1.5 POWDER, FOR SOLUTION ORAL at 18:30

## 2023-04-05 RX ADMIN — Medication 1 CAPSULE: at 18:30

## 2023-04-05 RX ADMIN — FLUOXETINE 30 MG: 10 CAPSULE ORAL at 16:00

## 2023-04-05 ASSESSMENT — ACTIVITIES OF DAILY LIVING (ADL)
ADLS_ACUITY_SCORE: 46
ADLS_ACUITY_SCORE: 43
ADLS_ACUITY_SCORE: 35
ADLS_ACUITY_SCORE: 35
ADLS_ACUITY_SCORE: 48
ADLS_ACUITY_SCORE: 42
ADLS_ACUITY_SCORE: 35
ADLS_ACUITY_SCORE: 35
ADLS_ACUITY_SCORE: 42
ADLS_ACUITY_SCORE: 42
ADLS_ACUITY_SCORE: 41
ADLS_ACUITY_SCORE: 42

## 2023-04-05 NOTE — PROGRESS NOTES
[facility]    Medicine Progress Note - Hospitalist Service    Date of Admission:  4/4/2023    Assessment & Plan   Estephania Castillo is a 67 year old female with a history of Htn, O2 dependent COPD, DM2, recent admission for diarrhea who is admitted on 4/4/2023 with recurrent diarrhea and hypotension.  Found to have recurrent C. difficile infection.     C. difficile colitis  Recurrent acute on chronic diarrhea  C.diff colonization (PCR+, antigen+, toxin- on 3/28/23)  Hypotension  BP improved in the ED after IVF. Was seen [facility] (3/27-4/1) for diarrhea where she declined colonoscopy for further work up to determine etiology (concern for viral, microscopic, ischemic, IBD, malignancy) and started on colestipol and psyllium husk. Noted to have c.diff colonization but not likely active infection.   -Continue IVF  -Repeat C.diff test is positive (PCR+, antigen+, toxin+ on 4/5/23)  -Enteric panel negative  -ID consulted, input appreciated  -Started Vancomycin 125 mg PO 4 times daily  -Check stool Giardia, crypto antigen  -Enteric isolation  -Diet as tolerated  -Continue psyllium husk  -If not improving consider asking GI to see again     Chronic hypoxic respiratory failure  Oxygen dependent COPD (2L)  Pulmonary hypertension  -continue chronic O2 therapy and mucinex     Hypotension related to volume depletion  Hypertension:   Hypotensive currently from hypovolemic.    -Continue IV fluid at 125 mL/h  -Hold any BP meds for now     DM2: appears to be diet controlled     Dyslipidemia: resume statin upon discharge     Intraventricular hemorrhage from AVM: status post suboccipital craniectomy with C1 laminectomy and partial cerebellar tonsil resection with residual speech difficulty and weakness of the lower extremities.   -Back to group home with outpatient therapy on discharge     Depression: Continue PTA Prozac       Diet: Regular Diet Adult    DVT Prophylaxis:  Pneumatic Compression Devices  Peralta Catheter: Not present  Lines: None     Cardiac Monitoring: None  Code Status: Full Code      Clinically Significant Risk Factors Present on Admission          # Hypocalcemia: Lowest iCa = 4.3 mg/dL in last 2 days, will monitor and replace as appropriate                      Disposition Plan      Expected Discharge Date: 04/05/2023                 obs, possible discharge back to group home if diarrhea improves and taking adequate po.    This patient was discussed with Dr. Yeager of the hospitalist service.  She is in agreement with my assessment and plan of care.    The patient's care was discussed with the bedside nurse, patient and patient's sister who is also her guardian.    CINDY Greenfield  Hospitalist Service  [facility]  Securely message with [employer] (more info)  Text page via [facility] Paging/Directory   ______________________________________________________________________    Interval History   Pt resting in bed on my arrival.  Frequent diarrhea.  She is afebrile.  BP is soft.  Denies abdominal pain currently.  Sister present at bedside and helps provide history.    Physical Exam   Vital Signs: Temp: 99.6  F (37.6  C) Temp src: Oral BP: 95/60 Pulse: 77   Resp: 18 SpO2: 98 % O2 Device: Nasal cannula Oxygen Delivery: 2 LPM  Weight: 0 lbs 0 oz    Constitutional: Alert, oriented to person, place, situation.  Cooperative, lying in bed in NAD.   Respiratory:  Lungs clear, breathing unlabored.  GI:  Abdomen soft, NT/ND and with normoactive BS.  Ventral hernia noted.  Skin/Integumen:  Warm, dry, non-diaphoretic.  MSK: No peripheral edema.      Medical Decision Making       36 MINUTES SPENT BY ME on the date of service doing chart review, history, exam, documentation & further activities per the note.      Data     I have personally reviewed the following data over the past 24 hrs:    11.3 (H)  \   12.2   / 376     139 108 (H) 8.1 /  78   3.5 26 0.59 \        Procal: N/A CRP: N/A Lactic Acid: 0.5 (L)         Imaging results reviewed over the past 24 hrs:   No results found for this or any previous visit (from the past 24 hour(s)).

## 2023-04-05 NOTE — H&P
[facility]    History and Physical - Hospitalist Service       Date of Admission:  4/4/2023     Assessment & Plan      Estephania Castillo is a 67 year old female with a history of Htn, O2 dependent COPD, DM2, recent admission for diarrhea who is admitted on 4/4/2023 with recurrent diarrhea and hypotension    Recurrent acute on chronic diarrhea  C.diff colonization (PCR+, antigen+, toxin- on 3/28/23)  Hypotension  BP improved in the ED after IVF. Was seen by [facility] at [facility] (3/27-4/1) for diarrhea where she declined colonoscopy for further work up to determine etiology (concern for viral, microscopic, ischemic, IBD, malignancy) and started on colestipol and psyllium husk. Noted to have c.diff colonization but not likely active infection.   -continue IVF  -repeat C.diff to assess for infection - hold any abx for now  -enteric panel  -enteric isolation  -diet as tolerated  -continue colestipol and psyllium husk  -if not improving consider asking GI to see again    Chronic hypoxic respiratory failure  Oxygen dependent COPD (2L)  Pulmonary hypertension  -continue chronic O2 therapy and mucinex    Hypertension: hypotensive currently from hypovolemic.    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   -back to group home with outpatient therapy on discharge    Depression: prozac       Diet:   Reg as tolerated  DVT Prophylaxis: Pneumatic Compression Devices  Peralta Catheter: Not present  Lines: None     Cardiac Monitoring: None  Code Status:   Full, discussed with patient    Disposition: obs, possible discharge back to group home on Wednesday if diarrhea improves and taking adequate  po    Clinically Significant Risk Factors Present on Admission          # Hypocalcemia: Lowest iCa = 4.3 mg/dL in last 2 days, will monitor and replace as appropriate                              Triston Gonzales,   Hospitalist Service  [facility]  Securely message with MarkTend (more info)  Text page via [employer] Paging/Directory     ______________________________________________________________________    Chief Complaint   diarrhea    History is obtained from the patient and ED physician    History of Present Illness   Estephania Castillo is a 67 year old female who presents from her group home with diarrhea and hypotension. She was recently admitted at [facility] from 3/27-4/1 with acute worsening of her more chronic diarrhea. Over the 2 weeks prior to last admission diarrhea was worsening to 4-5 times per day and she was feeling weak.  Upon presentation to ED she was hypotensive. C.diff panel was positive for PCR and antigen, neg for toxin so likely colonization. She saw GI ([facility]) who recommended colestipol and psyllium husk as patient declined colonscopy. She was discharged to the group home where she initially felt a bit better.  Over the past 2 days she has had increasing diarrhea and staff noted her BP to be 90/60s with increasing weakness to sent to the ED. Patient notes she feels tired and weak but better after 1L NS in the ED. BP also better into the low 100s. She denies blood in her stool, abd pain, nausea or fevers. She had two courses of abx weeks ago, not entirely sure on timing but none recently. No sick contacts or exposures.  Repeat C.diff ordered as well as enteric panel.       Past Medical History    Past Medical History:   Diagnosis Date     COPD (chronic obstructive pulmonary disease) (H)     O2 use, 2L     Depression      DM2 (diabetes mellitus, type 2) (H)      Dyslipidemia      HTN (hypertension)      Nontraumatic intraventricular intracerebral  hemorrhage (H)     due to AVM     SUBHA (obstructive sleep apnea)      Pulmonary hypertension (H)        Past Surgical History   No past surgical history on file.    Prior to Admission Medications   None        Review of Systems    The 10 point Review of Systems is negative other than noted in the HPI or here.      Physical Exam   Vital Signs: Temp: 99.6  F (37.6  C) Temp src: Oral BP: 106/54 Pulse: 77   Resp: 18 SpO2: 99 % O2 Device: Nasal cannula Oxygen Delivery: 2 LPM  Weight: 0 lbs 0 oz    General: lying in bed, appears comfortable  CV: RRR no m/r/g  Pulm: CTAB, no wheeze or rhonchi  GI: +BS, soft, ventral hernia noted, non tender  Lymph: no edema    Medical Decision Making             Data     I have personally reviewed the following data over the past 24 hrs:    11.3 (H)  \   12.2   / 376     138 105 9.4 /  81   3.6 29 0.70 \       Procal: N/A CRP: N/A Lactic Acid: 0.5 (L)         Imaging results reviewed over the past 24 hrs:   No results found for this or any previous visit (from the past 24 hour(s)).

## 2023-04-05 NOTE — ED NOTES
Pt cleaned, brief, and bedding changed. Barrier Cream applied to starting of redness of frequent diarrhea.

## 2023-04-05 NOTE — ED NOTES
[facility]  ED Nurse Handoff Report    ED Chief complaint: Generalized Weakness and Hypotension      ED Diagnosis:   Final diagnoses:   None       Code Status: Md to Address    Allergies:   Allergies   Allergen Reactions     Sulfa Drugs        Patient Story: Pt presents with generalized weakness and hypotension. Pt been having diarrhea as well - pt was noted to have C. difficile without active infection during last hospital admission a week ago. New stool sample collected and enteric precautions placed. Pt BP has been in low 90s and high 80s - BP has improved slightly with bolus and maintenance fluids. Pt has a hx of a stroke resulting in the inability to ambulate on her own.   Focused Assessment:  Aox4. Forgetful. Hypotension otherwise VSS on 2L NC. Bed bound. PIV infusing NS @ 125 mL/hr.    Treatments and/or interventions provided: IV fluids  Labs Ordered and Resulted from Time of ED Arrival to Time of ED Departure   ROUTINE UA WITH MICROSCOPIC REFLEX TO CULTURE - Abnormal       Result Value    Color Urine Yellow      Appearance Urine Clear      Glucose Urine Negative      Bilirubin Urine Negative      Ketones Urine Negative      Specific Gravity Urine 1.022      Blood Urine Small (*)     pH Urine 5.5      Protein Albumin Urine Negative      Urobilinogen Urine Normal      Nitrite Urine Negative      Leukocyte Esterase Urine Negative      Mucus Urine Present (*)     RBC Urine 84 (*)     WBC Urine 5      Squamous Epithelials Urine <1     CBC WITH PLATELETS - Abnormal    WBC Count 11.3 (*)     RBC Count 4.43      Hemoglobin 12.2      Hematocrit 37.5      MCV 85      MCH 27.5      MCHC 32.5      RDW 14.6      Platelet Count 376     BASIC METABOLIC PANEL - Abnormal    Sodium 138      Potassium 3.6      Chloride 105      Carbon Dioxide (CO2) 29      Anion Gap 4 (*)     Urea Nitrogen 9.4      Creatinine 0.70      Calcium 7.7 (*)     Glucose 81      GFR Estimate >90     LACTIC ACID WHOLE BLOOD - Abnormal     Lactic Acid 0.5 (*)    IONIZED CALCIUM - Abnormal    Calcium Ionized 4.3 (*)    MAGNESIUM - Normal    Magnesium 1.8     ENTERIC BACTERIA AND VIRUS PANEL BY NAYE STOOL   C. DIFFICILE TOXIN B PCR WITH REFLEX TO C. DIFFICILE ANTIGEN AND TOXINS A/B EIA     Patient's response to treatments and/or interventions: tolerating well     To be done/followed up on inpatient unit:  hospitalist consult     Does this patient have any cognitive concerns?: Forgetful    Activity level - Baseline/Home:  Total Care  Activity Level - Current:   Total Care    Patient's Preferred language: English   Needed?: No    Isolation: enteric   Infection:   C-Diff Pending  Patient tested for COVID 19 prior to admission: NO  Bariatric?: No    Vital Signs:   Vitals:    04/04/23 1904 04/04/23 2027 04/04/23 2106 04/04/23 2130   BP: 93/54 107/45 97/52 106/54   Pulse: 78 88  77   Resp:       Temp:       TempSrc:       SpO2: 98% 95% 98% 99%       Cardiac Rhythm:     Was the PSS-3 completed:   Yes  What interventions are required if any?               Family Comments: sister updated at bedside     For the majority of the shift this patient's behavior was Green.   Behavioral interventions performed were n/a.    ED NURSE PHONE NUMBER: [phone]

## 2023-04-05 NOTE — PHARMACY-ADMISSION MEDICATION HISTORY
Pharmacy Medication History  Admission medication history interview status for the 4/4/2023  admission is complete. See EPIC admission navigator for prior to admission medications     Location of Interview: Outside patient room but on unit  Medication history sources: med list from [facility]. no last dose info.     Significant changes made to the medication list:  Added all meds to list    In the past week, patient estimated taking medication this percent of the time: unknown  Medication Affordability:       Additional medication history information:   I called facility to obtain last dose info and no one answered and the phone mailbox was full.    Medication reconciliation completed by provider prior to medication history? No    Time spent in this activity: 15 minutes    Prior to Admission medications    Medication Sig Last Dose Taking? Auth Provider Long Term End Date   acetaminophen (TYLENOL) 325 MG tablet Take 650 mg by mouth every 6 hours as needed for fever or pain  Yes Unknown, Entered By History     albuterol (PROAIR HFA/PROVENTIL HFA/VENTOLIN HFA) 108 (90 Base) MCG/ACT inhaler Inhale 1 puff into the lungs every 6 hours as needed for shortness of breath  Yes Unknown, Entered By History     ammonium lactate (LAC-HYDRIN) 12 % external lotion Apply topically daily Apply to legs  Yes Unknown, Entered By History     Ascorbic Acid 500 MG CHEW Take 500 mg by mouth daily  Yes Unknown, Entered By History     atorvastatin (LIPITOR) 40 MG tablet Take 40 mg by mouth daily  Yes Unknown, Entered By History     Dextromethorphan-guaiFENesin (MUCINEX DM MAXIMUM STRENGTH)  MG TB12 Take 1 tablet by mouth 2 times daily as needed  Yes Unknown, Entered By History     Dimethicone 5 % CREA Apply topically daily as needed Apply to bottom for skin irritation or reddened skin  Yes Unknown, Entered By History     FLUoxetine (PROZAC) 10 MG capsule Take 10 mg by mouth every morning Give with 20mg to equal 30mg  Yes Unknown, Entered By  History     FLUoxetine 20 MG tablet Take 20 mg by mouth every morning Give with 10mg to equal 30mg  Yes Unknown, Entered By History     fluticasone (FLONASE) 50 MCG/ACT nasal spray Spray 1 spray into both nostrils every morning  Yes Unknown, Entered By History     fluticasone-salmeterol (WIXELA INHUB) 500-50 MCG/ACT inhaler Inhale 1 puff into the lungs every 12 hours  Yes Unknown, Entered By History Yes 10/5/23   hydrOXYzine (ATARAX) 50 MG tablet Take 50 mg by mouth every 6 hours as needed for anxiety  Yes Unknown, Entered By History     Multiple Vitamin (ONE-A-DAY ESSENTIAL) TABS Take 1 tablet by mouth daily  Yes Unknown, Entered By History     polyvinyl alcohol (LIQUIFILM TEARS) 1.4 % ophthalmic solution Place 1 drop Into the left eye 4 times daily At 8am, 1pm, 5pm, and 8pm  Yes Unknown, Entered By History     potassium chloride (KLOR-CON) 20 MEQ packet Take 20 mEq by mouth 2 times daily (with meals) Mix with liquid and take at 8am and 5pm  Yes Unknown, Entered By History     Psyllium 33 % POWD Take 1 packet by mouth 2 times daily (with meals) Mix with liquid and take at 8am and 5pm  Yes Unknown, Entered By History         The information provided in this note is only as accurate as the sources available at the time of update(s)

## 2023-04-05 NOTE — CONSULTS
[facility]    Infectious Disease Consultation     Date of Admission:  4/4/2023  Date of Consult : 04/05/23    Assessment:  67YF who has been hospitalized with chronic diarrhea and was found to have C.diff colitis. She recently completed a 4 week antibiotic course for abdominal wall cellulitis    -C.diff colitis related to antibiotic use  -Diarrhea, chronic with acute worsening  -Hypotension related to volume depletion  -Obesity  -chronic medical conditions -HTN, diabetes, pulmonary HTN, CNS hemorrhage from AVM requiring craniectomy, hx of perionitis    Recommendations:  1. Check stool giardia, crypto antigen  2. Treat C.diff with oral vancomycin 125 mg QID      Alina Mariscal MD    Reason for Consult   I was asked to evaluate this patient for treatment recommendations for C.diff colitis    Primary Care Physician   ORALIA SANDOVAL    Chief Complaint   Diarrhea and hypotension    History is obtained from the patient and medical records    History of Present Illness   Estephania Castillo is a 67 year old female who has been hospitalized from her group home with diarrhea and hypotension and tested positive for c.diff    She had a recent hospitalization at [facility] from 3/27-4/1 for diarrhea. C.diff testing on 3/28 showed positive GDH antigen and PCR but negative toxin consistent with colonization. She reported chronic diarrhea for a few months. She was seen bu GI, declined colonoscopy and was initiated on colestipol but has been re hospitalized now with increasing diarrhea and hypotension related to c.diff colitis    She is afebrile, had leukocytosis of 11.3K on admission  Sister reports patient has had profuse diarrhea for 12 days with confusion and disorientation. No fever or chills    Patient reports being on antibiotics for 4 weeks for abdominal wall cellulitis, she has a ventral hernia for which she is to get surgery soon, she also reports testing positive for a parasitic infection at  [facility]. She has had a prior intracranial bleed related to AVM 2 years ago and has some deficits from that episode    Past Medical History   I have reviewed this patient's medical history and updated it with pertinent information if needed.   Past Medical History:   Diagnosis Date     COPD (chronic obstructive pulmonary disease) (H)     O2 use, 2L     Depression      DM2 (diabetes mellitus, type 2) (H)      Dyslipidemia      HTN (hypertension)      Nontraumatic intraventricular intracerebral hemorrhage (H)     due to AVM     SUBHA (obstructive sleep apnea)      Pulmonary hypertension (H)        Past Surgical History   I have reviewed this patient's surgical history and updated it with pertinent information if needed.  No past surgical history on file.    Prior to Admission Medications   Prior to Admission Medications   Prescriptions Last Dose Informant Patient Reported? Taking?   Ascorbic Acid 500 MG CHEW   Yes Yes   Sig: Take 500 mg by mouth daily   Dextromethorphan-guaiFENesin (MUCINEX DM MAXIMUM STRENGTH)  MG TB12   Yes Yes   Sig: Take 1 tablet by mouth 2 times daily as needed   Dimethicone 5 % CREA   Yes Yes   Sig: Apply topically daily as needed Apply to bottom for skin irritation or reddened skin   FLUoxetine (PROZAC) 10 MG capsule   Yes Yes   Sig: Take 10 mg by mouth every morning Give with 20mg to equal 30mg   FLUoxetine 20 MG tablet   Yes Yes   Sig: Take 20 mg by mouth every morning Give with 10mg to equal 30mg   Multiple Vitamin (ONE-A-DAY ESSENTIAL) TABS   Yes Yes   Sig: Take 1 tablet by mouth daily   Psyllium 33 % POWD   Yes Yes   Sig: Take 1 packet by mouth 2 times daily (with meals) Mix 3.4grams/1 packet with liquid and take at 8am and 5pm   acetaminophen (TYLENOL) 325 MG tablet   Yes Yes   Sig: Take 650 mg by mouth every 6 hours as needed for fever or pain   albuterol (PROAIR HFA/PROVENTIL HFA/VENTOLIN HFA) 108 (90 Base) MCG/ACT inhaler   Yes Yes   Sig: Inhale 1 puff into the lungs every 6  hours as needed for shortness of breath   ammonium lactate (LAC-HYDRIN) 12 % external lotion   Yes Yes   Sig: Apply topically daily Apply to legs   atorvastatin (LIPITOR) 40 MG tablet   Yes Yes   Sig: Take 40 mg by mouth daily   fluticasone (FLONASE) 50 MCG/ACT nasal spray   Yes Yes   Sig: Spray 1 spray into both nostrils every morning   fluticasone-salmeterol (WIXELA INHUB) 500-50 MCG/ACT inhaler   Yes Yes   Sig: Inhale 1 puff into the lungs every 12 hours   hydrOXYzine (ATARAX) 50 MG tablet   Yes Yes   Sig: Take 50 mg by mouth every 6 hours as needed for anxiety   polyvinyl alcohol (LIQUIFILM TEARS) 1.4 % ophthalmic solution   Yes Yes   Sig: Place 1 drop Into the left eye 4 times daily At 8am, 1pm, 5pm, and 8pm   potassium chloride (KLOR-CON) 20 MEQ packet   Yes Yes   Sig: Take 20 mEq by mouth 2 times daily (with meals) Mix with liquid and take at 8am and 5pm      Facility-Administered Medications: None     Allergies   Allergies   Allergen Reactions     Bupropion Anaphylaxis     Sulfa Drugs        Immunization History     There is no immunization history on file for this patient.    Social History   I have reviewed this patient's social history and updated it with pertinent information if needed. Estephania Castillo      Family History   I have reviewed this patient's family history and updated it with pertinent information if needed.   No family history on file.    Review of Systems   The 10 point Review of Systems is as per HPI    Physical Exam   Temp: 98.4  F (36.9  C) Temp src: Oral BP: 98/55 Pulse: 72   Resp: 18 SpO2: 99 % O2 Device: Nasal cannula Oxygen Delivery: 2 LPM  Vital Signs with Ranges  Temp:  [98  F (36.7  C)-99.6  F (37.6  C)] 98.4  F (36.9  C)  Pulse:  [70-88] 72  Resp:  [18-24] 18  BP: ()/(44-62) 98/55  SpO2:  [88 %-99 %] 99 %  0 lbs 0 oz  There is no height or weight on file to calculate BMI.    GENERAL APPEARANCE:  awake  EYES: Eyes grossly normal to inspection  NECK: no adenopathy  RESP:  lungs clear   CV: regular rates and rhythm  ABDOMEN: soft, nontender, ventral hernia and a chronic abdominal wound related to prior drain  MS: extremities normal  SKIN: no suspicious lesions or rashes      Data   All laboratory data reviewed  Component      Latest Ref Rn 4/5/2023   Sodium      136 - 145 mmol/L 139    Potassium      3.4 - 5.3 mmol/L 3.5    Chloride      98 - 107 mmol/L 108 (H)    Carbon Dioxide (CO2)      22 - 29 mmol/L 26    Anion Gap      7 - 15 mmol/L 5 (L)    Urea Nitrogen      8.0 - 23.0 mg/dL 8.1    Creatinine      0.51 - 0.95 mg/dL 0.59    Calcium      8.8 - 10.2 mg/dL 7.5 (L)    Glucose      70 - 99 mg/dL 78    GFR Estimate      >60 mL/min/1.73m2 >90       Component      Latest Ref Rn 4/4/2023   Campylobacter group by NAYE      Not Detected  Not Detected    Salmonella species by NAYE      Not Detected  Not Detected    Shigella species by NAYE      Not Detected  Not Detected    Vibrio group by NAYE      Not Detected  Not Detected    Rotavirus A by NAYE      Not Detected  Not Detected    Shiga toxin 1 gene by NAYE      Not Detected  Not Detected    Shiga toxin 2 gene by NYAE      Not Detected  Not Detected    Norovirus I and II by NAYE      Not Detected  Not Detected    Yersinia enterocolitica by NAYE      Not Detected  Not Detected    C. difficile GDH Antigen      Negative  Positive !    C. Difficile Toxin      Negative  Positive !    C Difficile Toxin B by PCR      Negative  Positive !       Component      Latest Ref Rn 4/4/2023   Campylobacter group by NAYE      Not Detected  Not Detected    Salmonella species by NAYE      Not Detected  Not Detected    Shigella species by NAYE      Not Detected  Not Detected    Vibrio group by NAYE      Not Detected  Not Detected    Rotavirus A by NAYE      Not Detected  Not Detected    Shiga toxin 1 gene by NAYE      Not Detected  Not Detected    Shiga toxin 2 gene by NAYE      Not Detected  Not Detected    Norovirus I and II by NAYE      Not Detected  Not Detected     Yersinia enterocolitica by NAYE      Not Detected  Not Detected    C. difficile GDH Antigen      Negative  Positive !    C. Difficile Toxin      Negative  Positive !    C Difficile Toxin B by PCR      Negative  Positive !       Legend:  ! Abnormal

## 2023-04-05 NOTE — PLAN OF CARE
Orientation/Cognitive: A&Ox4, forgetful at times  Observation Goals (Met/ Not Met): not met  Mobility Level/Assist Equipment: assist x2 to turn, lift  Fall Risk (Y/N): yes  Behavior Concerns: none  Pain Management: denies pain  Tele/VS/O2:  Temp 99.9, other VSS on 2L O2 (baseline)  ABNL Lab/BG: positive for c-diff  Diet: regular  Bowel/Bladder: incontinent  Skin Concerns: mepilex on abdomen, pt refused to let RN assess why  Drains/Devices: IV infusing  Tests/Procedures for next shift: monitor BP, collect stool sample  Anticipated DC date & active delays: 4/6?  Patient Stated Goal for Today: rest

## 2023-04-05 NOTE — UTILIZATION REVIEW
Admission Status; Secondary Review Determination    Under the authority of the Utilization Management Committee, the utilization review process indicated a secondary review on the above patient. The review outcome is based on review of the medical records, discussions with staff, and applying clinical experience noted on the date of the review.     (x) Inpatient Status Appropriate - This patient's medical care is consistent with medical management for inpatient care and reasonable inpatient medical practice.    RATIONALE FOR DETERMINATION: 67-year-old female with history of type 2 diabetes, hypertension, oxygen dependent COPD, recent hospitalization for diarrhea now presents with significant acute worsening diarrhea associated with hypotension.  Patient previously felt to have C. difficile colonization but with acute worsening and recent 4 weeks of antibiotics for abdominal wall cellulitis patient will require acute management with with vancomycin, IV fluids with associated leukocytosis and hypercalcemia appropriate for inpatient care.    At the time of admission with the information available to the attending physician more than 2 nights Hospital complex care was anticipated, based on patient risk of adverse outcome if treated as outpatient and complex care required. Inpatient admission is appropriate based on the Medicare guidelines.    This document was produced using voice recognition software    The information on this document is developed by the utilization review team in order for the business office to ensure compliance. This only denotes the appropriateness of proper admission status and does not reflect the quality of care rendered.    The definitions of Inpatient Status and Observation Status used in making the determination above are those provided in the CMS Coverage Manual, Chapter 1 and Chapter 6, section 70.4.    Sincerely,    Kalyan Boss MD  Utilization Review  Physician Advisor  [facility].

## 2023-04-05 NOTE — CARE PLAN
Orientation/Cognitive: A&OX4, at times forgetful   Observation Goals (Met/ Not Met): Not  met  Mobility Level/Assist Equipment: A2, lift  Fall Risk (Y/N): Y  Behavior Concerns: None  Pain Management: Denies pain  Tele/VS/O2: BP soft, other VSS on 2L 02 (baseline)   ABNL Lab/BG: C- Difficile  Diet: Reg  Bowel/Bladder: Incont  Skin Concerns: None  Drains/Devices: None  Tests/Procedures for next shift: None  Anticipated DC date & active delays: TBD  Patient Stated Goal for Today: Rest    Diarrhea improved, - not met   BP stable,- not met   taking adequate po- met  Nurse to notify provider when observation goals have been met and patient is ready for discharge

## 2023-04-06 LAB
ANION GAP SERPL CALCULATED.3IONS-SCNC: 6 MMOL/L (ref 7–15)
BUN SERPL-MCNC: 6.1 MG/DL (ref 8–23)
C PARVUM AG STL QL IA: NEGATIVE
CALCIUM SERPL-MCNC: 7.3 MG/DL (ref 8.8–10.2)
CHLORIDE SERPL-SCNC: 111 MMOL/L (ref 98–107)
CREAT SERPL-MCNC: 0.59 MG/DL (ref 0.51–0.95)
DEPRECATED HCO3 PLAS-SCNC: 23 MMOL/L (ref 22–29)
ERYTHROCYTE [DISTWIDTH] IN BLOOD BY AUTOMATED COUNT: 14.8 % (ref 10–15)
G LAMBLIA AG STL QL IA: NEGATIVE
GFR SERPL CREATININE-BSD FRML MDRD: >90 ML/MIN/1.73M2
GLUCOSE SERPL-MCNC: 74 MG/DL (ref 70–99)
HCT VFR BLD AUTO: 33.9 % (ref 35–47)
HGB BLD-MCNC: 10.7 G/DL (ref 11.7–15.7)
MCH RBC QN AUTO: 27.2 PG (ref 26.5–33)
MCHC RBC AUTO-ENTMCNC: 31.6 G/DL (ref 31.5–36.5)
MCV RBC AUTO: 86 FL (ref 78–100)
PLATELET # BLD AUTO: 310 10E3/UL (ref 150–450)
POTASSIUM SERPL-SCNC: 3.2 MMOL/L (ref 3.4–5.3)
RBC # BLD AUTO: 3.93 10E6/UL (ref 3.8–5.2)
SODIUM SERPL-SCNC: 140 MMOL/L (ref 136–145)
WBC # BLD AUTO: 11.5 10E3/UL (ref 4–11)

## 2023-04-06 PROCEDURE — 120N000001 HC R&B MED SURG/OB

## 2023-04-06 PROCEDURE — 36415 COLL VENOUS BLD VENIPUNCTURE: CPT | Performed by: PHYSICIAN ASSISTANT

## 2023-04-06 PROCEDURE — 250N000013 HC RX MED GY IP 250 OP 250 PS 637: Performed by: INTERNAL MEDICINE

## 2023-04-06 PROCEDURE — 258N000003 HC RX IP 258 OP 636: Performed by: INTERNAL MEDICINE

## 2023-04-06 PROCEDURE — 85027 COMPLETE CBC AUTOMATED: CPT | Performed by: PHYSICIAN ASSISTANT

## 2023-04-06 PROCEDURE — 99232 SBSQ HOSP IP/OBS MODERATE 35: CPT | Performed by: SPECIALIST

## 2023-04-06 PROCEDURE — 999N000147 HC STATISTIC PT IP EVAL DEFER: Performed by: PHYSICAL THERAPIST

## 2023-04-06 PROCEDURE — 99233 SBSQ HOSP IP/OBS HIGH 50: CPT | Performed by: INTERNAL MEDICINE

## 2023-04-06 PROCEDURE — 80048 BASIC METABOLIC PNL TOTAL CA: CPT | Performed by: PHYSICIAN ASSISTANT

## 2023-04-06 PROCEDURE — 250N000013 HC RX MED GY IP 250 OP 250 PS 637: Performed by: PHYSICIAN ASSISTANT

## 2023-04-06 RX ORDER — ALBUTEROL SULFATE 90 UG/1
2 AEROSOL, METERED RESPIRATORY (INHALATION) EVERY 6 HOURS PRN
Status: DISCONTINUED | OUTPATIENT
Start: 2023-04-06 | End: 2023-04-07 | Stop reason: HOSPADM

## 2023-04-06 RX ORDER — ATORVASTATIN CALCIUM 40 MG/1
40 TABLET, FILM COATED ORAL DAILY
Status: DISCONTINUED | OUTPATIENT
Start: 2023-04-06 | End: 2023-04-07 | Stop reason: HOSPADM

## 2023-04-06 RX ORDER — FLUTICASONE FUROATE AND VILANTEROL 200; 25 UG/1; UG/1
1 POWDER RESPIRATORY (INHALATION) DAILY
Status: DISCONTINUED | OUTPATIENT
Start: 2023-04-06 | End: 2023-04-07 | Stop reason: HOSPADM

## 2023-04-06 RX ORDER — FLUTICASONE PROPIONATE 50 MCG
1 SPRAY, SUSPENSION (ML) NASAL EVERY MORNING
Status: DISCONTINUED | OUTPATIENT
Start: 2023-04-06 | End: 2023-04-07 | Stop reason: HOSPADM

## 2023-04-06 RX ADMIN — CARBOXYMETHYLCELLULOSE SODIUM 1 DROP: 5 SOLUTION/ DROPS OPHTHALMIC at 09:35

## 2023-04-06 RX ADMIN — CARBOXYMETHYLCELLULOSE SODIUM 1 DROP: 5 SOLUTION/ DROPS OPHTHALMIC at 16:01

## 2023-04-06 RX ADMIN — FLUTICASONE FUROATE AND VILANTEROL TRIFENATATE 1 PUFF: 200; 25 POWDER RESPIRATORY (INHALATION) at 12:24

## 2023-04-06 RX ADMIN — VANCOMYCIN HYDROCHLORIDE 125 MG: 125 CAPSULE ORAL at 16:00

## 2023-04-06 RX ADMIN — POTASSIUM CHLORIDE 20 MEQ: 1.5 POWDER, FOR SOLUTION ORAL at 17:40

## 2023-04-06 RX ADMIN — FLUOXETINE 30 MG: 10 CAPSULE ORAL at 09:35

## 2023-04-06 RX ADMIN — VANCOMYCIN HYDROCHLORIDE 125 MG: 125 CAPSULE ORAL at 12:24

## 2023-04-06 RX ADMIN — ALBUTEROL SULFATE 2 PUFF: 90 AEROSOL, METERED RESPIRATORY (INHALATION) at 16:35

## 2023-04-06 RX ADMIN — SODIUM CHLORIDE: 9 INJECTION, SOLUTION INTRAVENOUS at 00:03

## 2023-04-06 RX ADMIN — Medication 1 CAPSULE: at 09:35

## 2023-04-06 RX ADMIN — POTASSIUM CHLORIDE 20 MEQ: 1.5 POWDER, FOR SOLUTION ORAL at 09:35

## 2023-04-06 RX ADMIN — FLUTICASONE PROPIONATE 1 SPRAY: 50 SPRAY, METERED NASAL at 12:24

## 2023-04-06 RX ADMIN — Medication: at 17:40

## 2023-04-06 RX ADMIN — CARBOXYMETHYLCELLULOSE SODIUM 1 DROP: 5 SOLUTION/ DROPS OPHTHALMIC at 19:50

## 2023-04-06 RX ADMIN — ATORVASTATIN CALCIUM 40 MG: 40 TABLET, FILM COATED ORAL at 12:24

## 2023-04-06 RX ADMIN — CARBOXYMETHYLCELLULOSE SODIUM 1 DROP: 5 SOLUTION/ DROPS OPHTHALMIC at 12:25

## 2023-04-06 RX ADMIN — VANCOMYCIN HYDROCHLORIDE 125 MG: 125 CAPSULE ORAL at 19:49

## 2023-04-06 RX ADMIN — VANCOMYCIN HYDROCHLORIDE 125 MG: 125 CAPSULE ORAL at 09:35

## 2023-04-06 ASSESSMENT — ACTIVITIES OF DAILY LIVING (ADL)
ADLS_ACUITY_SCORE: 54
ADLS_ACUITY_SCORE: 46
ADLS_ACUITY_SCORE: 54
ADLS_ACUITY_SCORE: 46
ADLS_ACUITY_SCORE: 46
ADLS_ACUITY_SCORE: 54
ADLS_ACUITY_SCORE: 54
ADLS_ACUITY_SCORE: 46
ADLS_ACUITY_SCORE: 54
ADLS_ACUITY_SCORE: 46

## 2023-04-06 NOTE — PROGRESS NOTES
"Hospitalist tahir messaged with this message\"room 507, JF, Pt is requestig albuterol. please advise. thank you. kennedy NR \". Will continue to monitor patient. Kenndey Hurst RN on 4/6/2023 at 3:33 PM    1726 - Orientation/Cognitive: A&Ox4, forgetful at times  Observation Goals (Met/ Not Met): Inpatient  Mobility Level/Assist Equipment: assist x2 lift, T/R  Fall Risk (Y/N): yes  Behavior Concerns: Can get agitated easily.  Pain Management: denies pain  Tele/VS/O2:  VSS on 2L O2 (baseline)  ABNL Lab/BG: positive for c-diff;   Diet: regular  Bowel/Bladder: incontinent of bowel and bladder, 1 soft/loose/mucous stools this shift.  Skin Concerns: mepilex on abdomen, pt refused to let RN assess what is underneath   Drains/Devices: PIV infusing NS  Tests/Procedures for next shift: none   Anticipated DC date & active delays: Discharge back to group home with outpt therapy pending stools.  Patient Stated Goal for Today: rest  Other: ID following. Kennedy Hurst RN on 4/6/2023 at 5:27 PM      "

## 2023-04-06 NOTE — PROGRESS NOTES
[facility]    Infectious Disease Progress Note    Date of Service : 04/06/2023       Assessment:  67YF who has been hospitalized with chronic diarrhea and was found to have C.diff colitis. 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 Check stool cyclospora/isospora stain  2. Treat C.diff with oral vancomycin 125 mg QID X 14 days    Alina Mariscal MD    Interval History   Eating lunch, denies abdominal pain, diarrhea is improving    Physical Exam   Temp: 98.7  F (37.1  C) Temp src: Oral BP: 107/58 Pulse: 80   Resp: 16 SpO2: 95 % O2 Device: Nasal cannula Oxygen Delivery: 2 LPM  There were no vitals filed for this visit.  Vital Signs with Ranges  Temp:  [98  F (36.7  C)-99.9  F (37.7  C)] 98.7  F (37.1  C)  Pulse:  [70-88] 80  Resp:  [14-18] 16  BP: ()/(50-66) 107/58  SpO2:  [91 %-98 %] 95 %       GENERAL APPEARANCE:  awake  EYES: Eyes grossly normal to inspection  NECK: no adenopathy  RESP: lungs clear   CV: regular rates and rhythm  ABDOMEN: soft, nontender, ventral hernia and a chronic abdominal wound related to prior drain  MS: extremities normal  SKIN: no suspicious lesions or rashes    Other:    Medications     sodium chloride 75 mL/hr at 04/06/23 0950       atorvastatin  40 mg Oral Daily     artificial tears  1 drop Left Eye 4x Daily     FLUoxetine  30 mg Oral QAM     fluticasone  1 spray Both Nostrils QAM     fluticasone-vilanterol  1 puff Inhalation Daily     potassium chloride  20 mEq Oral BID w/meals     psyllium   Oral BID w/meals     vancomycin  125 mg Oral 4x Daily       Data   All microbiology laboratory data reviewed.  Recent Labs   Lab Test 04/06/23  0612  04/04/23  1902 11/08/22  1149   WBC 11.5* 11.3* 8.4   HGB 10.7* 12.2 13.2   HCT 33.9* 37.5 42.5   MCV 86 85 85    376 231     Recent Labs   Lab Test 04/06/23  0612 04/05/23  0735 04/04/23  1902   CR 0.59 0.59 0.70     Recent Labs   Lab Test 11/08/22  1149   SED 20     Component      Latest Ref Rn 4/5/2023   Cryptosporidium parvum antigen      Negative  Negative    Giardia lamblia antigen      Negative  Negative          Component      Latest Ref Rn 4/4/2023   Campylobacter group by NAYE      Not Detected  Not Detected    Salmonella species by NAYE      Not Detected  Not Detected    Shigella species by NAYE      Not Detected  Not Detected    Vibrio group by NAYE      Not Detected  Not Detected    Rotavirus A by NAYE      Not Detected  Not Detected    Shiga toxin 1 gene by NAYE      Not Detected  Not Detected    Shiga toxin 2 gene by NAYE      Not Detected  Not Detected    Norovirus I and II by NAYE      Not Detected  Not Detected    Yersinia enterocolitica by NAYE      Not Detected  Not Detected    C. difficile GDH Antigen      Negative  Positive !    C. Difficile Toxin      Negative  Positive !    C Difficile Toxin B by PCR      Negative  Positive !

## 2023-04-06 NOTE — PLAN OF CARE
PT:  Chart reviewed and orders received. Discussed with pt, pt lives in a [facility] and uses Lucía lift to transfer at baseline. No PT needs at this time. Will complete orders.

## 2023-04-06 NOTE — PROVIDER NOTIFICATION
MD Notification    Notified Person: MD    Notified Person Name: Dr Saroj Costa    Notification Date/Time: 4/6/2023  10:33  AM    Notification Interaction: Text paged    Purpose of Notification: Pt is needing AX2, from a group home, might need PT eval ?    Orders Received:    Comments:

## 2023-04-06 NOTE — PROGRESS NOTES
[facility]  Hospitalist Progress Note    Admit Date:  4/4/2023  Date of Service (when I saw the patient): 04/06/2023   Provider:  Aby Fagan, DO    Assessment & Plan   Estephania Castillo is a 67 year old female group home resisdent with a history of Htn, O2 dependent COPD, DM2, and recent admission for diarrhea who is admitted on 4/4/2023 with recurrent diarrhea and hypotension.  Found to have recurrent C. difficile infection with positive toxin on 4/4/23.      Problem List:     1.  C. difficile colitis  Recurrent acute on chronic diarrhea  H/o C.diff colonization (PCR+, antigen+, toxin- on 3/28/23)    Recent was seen by [facility] at [facility] (3/27-4/1) for diarrhea where she declined colonoscopy for further work up to determine etiology (concern for viral, microscopic, ischemic, IBD, malignancy) and started on colestipol and psyllium husk.    Repeat C.diff test is positive (PCR+, antigen+, toxin+ on 4/5/23)  Started on Vancomycin 125 mg PO 4 times daily    -ID consulted, input appreciated  -Enteric panel negative; stool Giardia, crypto antigen also negative    No COVID on admission - but ID feels not likely    Will continue IVF this am but at a decreased rate - pending on stooling during the day  D/w bedside RN in room this am - still having several loose stools overnight.     2. Chronic hypoxic respiratory failure  Oxygen dependent COPD (2L)  Pulmonary hypertension  -continue chronic O2 therapy and mucinex  Pt requested to restart prn albuterol    3. Hypotension related to volume depletion    Now pt and sister present tell me that her BP is normally 's  Prior diagnosis lists include HTN but I don't see an anti-hypertensive med listed    Continue IVF, as above, for now  Continue to monitor vitals closely     4. DM2:   appears to be diet controlled PTA  Blood glucose on chemistries have been 70-80's prior to eating     5. Dyslipidemia:   resume statin  upon discharge     6. Intraventricular hemorrhage from AVM: status post suboccipital craniectomy with C1 laminectomy and partial cerebellar tonsil resection with residual speech difficulty and weakness of the lower extremities.     Currently resides at group home facility     7. Depression: Continue PTA medications    Medical Decision Making     51 MINUTES SPENT BY ME on the date of service doing chart review, history, exam, documentation & further activities per the note.        Labs/Imaging Reviewed:  See Information above and Data section below    Diet: Regular Diet Adult    DVT Prophylaxis: Heparin SQ  Peralta Catheter: Not present  Code Status: Full Code      Disposition Plan      Expected Discharge Date: 04/07/2023        Discharge Comments: new admit.  monitoring stools.     Entered: Aby Fagan, DO 04/06/2023, 7:10 AM       The patient's care was discussed with the Bedside Nurse, Patient and Patient's Family.    Pt's sister is present at bedside - all questions answered    Interval History     Tired and hungry this am.  Can't tell me how many stools she has had or when the last one was.  RN reviewed overnight notes - states 6 loose stools overnight shift.  No abd pain/cramping.  No HA, CP, SOB, F/C or N/V.  Breathing ok but would feel better with her albuterol at her bedside.    Has breakfast ordered this am.      -Data reviewed today: I reviewed all new labs and imaging results over the last 24 hours. I personally reviewed no images or EKG's today.    Physical Exam   Temp: 98  F (36.7  C) Temp src: Oral BP: 109/51 Pulse: 72   Resp: 18 SpO2: 95 % O2 Device: Nasal cannula Oxygen Delivery: 2 LPM  There were no vitals filed for this visit.  Vital Signs with Ranges  Temp:  [98  F (36.7  C)-99.9  F (37.7  C)] 98  F (36.7  C)  Pulse:  [72-88] 72  Resp:  [14-24] 18  BP: ()/(51-66) 109/51  SpO2:  [95 %-99 %] 95 %  I/O last 3 completed shifts:  In: 420 [P.O.:420]  Out: -     GEN:  Sleeping when I  enter the room but easily arouses to my voice and touch  HEENT:  Normocephalic/atraumatic, no scleral icterus, no nasal discharge, mouth and membranes appear fairly moist  CV:  Somewhat distant but regular rate and rhythm, no loud murmur to ausc.  S1 + S2 noted, no S3 or S4.  LUNGS:  Clear to auscultation ant/lat bilaterally.  No clear rales/rhonchi/wheezing auscultated bilaterally but post exam somewhat limited this am d/t current body positioning and difficulty with turning with just my assistance.   No clear costal retractions bilaterally.  Symmetric chest rise on inhalation noted.  ABD:  Present bowel sounds, soft, no significant reproducible tenderness to exam, minimally distended.  No clear rebound/guarding/rigidity.  No masses or obvious HSM to palpation but exam limited secondary to body habitus.  EXT:  No significant pretibial edema or cyanosis bilaterally. No joint synovitis noted.  No calf-tenderness or asymmetry noted.  SKIN:  Dry to touch, no rashes or jaundice noted.  PSYCH:  Mood frustrated at not being able to get good sleep and early blood draws.  Cooperative with my exam  NEURO:  No tremors at rest, speech is slower but clear.      Data   Labs:  No results for input(s): CULT in the last 168 hours.  Recent Labs   Lab 04/06/23  0612 04/05/23  0735 04/04/23 1902    139 138   POTASSIUM 3.2* 3.5 3.6   CHLORIDE 111* 108* 105   CO2 23 26 29   ANIONGAP 6* 5* 4*   GLC 74 78 81   BUN 6.1* 8.1 9.4   CR 0.59 0.59 0.70   GFRESTIMATED >90 >90 >90   EDILIA 7.3* 7.5* 7.7*     Recent Labs   Lab 04/06/23  0612 04/04/23 1902   WBC 11.5* 11.3*   HGB 10.7* 12.2   HCT 33.9* 37.5   MCV 86 85    376     Recent Labs   Lab 04/06/23  0612 04/05/23  0735 04/04/23 1902    139 138   POTASSIUM 3.2* 3.5 3.6   CHLORIDE 111* 108* 105   CO2 23 26 29   ANIONGAP 6* 5* 4*   GLC 74 78 81   BUN 6.1* 8.1 9.4   CR 0.59 0.59 0.70   GFRESTIMATED >90 >90 >90   EDILIA 7.3* 7.5* 7.7*   MAG  --   --  1.8     Recent Labs   Lab  04/04/23 2027   COLOR Yellow   APPEARANCE Clear   URINEGLC Negative   URINEBILI Negative   URINEKETONE Negative   SG 1.022   UBLD Small*   URINEPH 5.5   PROTEIN Negative   NITRITE Negative   LEUKEST Negative   RBCU 84*   WBCU 5      Recent Imaging:   No results found for this or any previous visit (from the past 24 hour(s)).    Medications     sodium chloride 125 mL/hr at 04/06/23 0003       artificial tears  1 drop Left Eye 4x Daily     FLUoxetine  30 mg Oral QAM     potassium chloride  20 mEq Oral BID w/meals     psyllium   Oral BID w/meals     vancomycin  125 mg Oral 4x Daily

## 2023-04-06 NOTE — PLAN OF CARE
Orientation/Cognitive: A&Ox4, forgetful at times  Observation Goals (Met/ Not Met): Inpatient  Mobility Level/Assist Equipment: assist x2 lift, T/R  Fall Risk (Y/N): yes  Behavior Concerns: Can get agitated easily.  Pain Management: denies pain  Tele/VS/O2:  VSS on 2L O2 (baseline)  ABNL Lab/BG: positive for c-diff; WBC 11.3  Diet: regular  Bowel/Bladder: incontinent of bowel and bladder, 6 soft/loose/mucous stools this shift.  Skin Concerns: mepilex on abdomen, pt refused to let RN assess what is underneath   Drains/Devices: PIV infusing NS  Tests/Procedures for next shift: none   Anticipated DC date & active delays: Discharge back to group home with outpt therapy pending stools.  Patient Stated Goal for Today: rest  Other: ID following

## 2023-04-07 VITALS
TEMPERATURE: 98.5 F | OXYGEN SATURATION: 94 % | RESPIRATION RATE: 18 BRPM | SYSTOLIC BLOOD PRESSURE: 120 MMHG | HEIGHT: 67 IN | BODY MASS INDEX: 40.72 KG/M2 | HEART RATE: 67 BPM | DIASTOLIC BLOOD PRESSURE: 42 MMHG

## 2023-04-07 LAB
C CAYETANENSIS SPEC QL: NORMAL
CRYPTOSP STL QL ACID FAST STN: NORMAL
CYSTOISOSPORA BELLI: NORMAL

## 2023-04-07 PROCEDURE — 250N000013 HC RX MED GY IP 250 OP 250 PS 637: Performed by: PHYSICIAN ASSISTANT

## 2023-04-07 PROCEDURE — 99232 SBSQ HOSP IP/OBS MODERATE 35: CPT | Performed by: SPECIALIST

## 2023-04-07 PROCEDURE — 250N000013 HC RX MED GY IP 250 OP 250 PS 637: Performed by: INTERNAL MEDICINE

## 2023-04-07 PROCEDURE — 99239 HOSP IP/OBS DSCHRG MGMT >30: CPT | Performed by: INTERNAL MEDICINE

## 2023-04-07 PROCEDURE — 87206 SMEAR FLUORESCENT/ACID STAI: CPT | Performed by: SPECIALIST

## 2023-04-07 RX ORDER — VANCOMYCIN HYDROCHLORIDE 125 MG/1
125 CAPSULE ORAL 4 TIMES DAILY
Qty: 56 CAPSULE | Refills: 0 | Status: SHIPPED | OUTPATIENT
Start: 2023-04-07 | End: 2023-04-21

## 2023-04-07 RX ADMIN — POTASSIUM CHLORIDE 20 MEQ: 1.5 POWDER, FOR SOLUTION ORAL at 09:06

## 2023-04-07 RX ADMIN — VANCOMYCIN HYDROCHLORIDE 125 MG: 125 CAPSULE ORAL at 09:07

## 2023-04-07 RX ADMIN — ATORVASTATIN CALCIUM 40 MG: 40 TABLET, FILM COATED ORAL at 09:07

## 2023-04-07 RX ADMIN — FLUTICASONE PROPIONATE 1 SPRAY: 50 SPRAY, METERED NASAL at 09:13

## 2023-04-07 RX ADMIN — CARBOXYMETHYLCELLULOSE SODIUM 1 DROP: 5 SOLUTION/ DROPS OPHTHALMIC at 09:07

## 2023-04-07 RX ADMIN — CARBOXYMETHYLCELLULOSE SODIUM 1 DROP: 5 SOLUTION/ DROPS OPHTHALMIC at 13:28

## 2023-04-07 RX ADMIN — ALBUTEROL SULFATE 2 PUFF: 90 AEROSOL, METERED RESPIRATORY (INHALATION) at 09:13

## 2023-04-07 RX ADMIN — Medication 1 CAPSULE: at 09:07

## 2023-04-07 RX ADMIN — FLUTICASONE FUROATE AND VILANTEROL TRIFENATATE 1 PUFF: 200; 25 POWDER RESPIRATORY (INHALATION) at 09:15

## 2023-04-07 RX ADMIN — HYDROXYZINE HYDROCHLORIDE 50 MG: 25 TABLET, FILM COATED ORAL at 01:35

## 2023-04-07 RX ADMIN — FLUOXETINE 30 MG: 10 CAPSULE ORAL at 09:07

## 2023-04-07 RX ADMIN — VANCOMYCIN HYDROCHLORIDE 125 MG: 125 CAPSULE ORAL at 13:28

## 2023-04-07 ASSESSMENT — ACTIVITIES OF DAILY LIVING (ADL)
ADLS_ACUITY_SCORE: 54
DEPENDENT_IADLS:: CLEANING;COOKING;LAUNDRY;SHOPPING;MEAL PREPARATION;MEDICATION MANAGEMENT;MONEY MANAGEMENT;TRANSPORTATION
ADLS_ACUITY_SCORE: 54

## 2023-04-07 NOTE — CONSULTS
Care Management Initial Consult    General Information  Assessment completed with: Patient, Other (Guardian), Suzanne  Type of CM/SW Visit: Initial Assessment    Primary Care Provider verified and updated as needed: Yes Dr. Rubio [facility]   Readmission within the last 30 days: previous discharge plan unsuccessful      Reason for Consult: discharge planning  Advance Care Planning:            Communication Assessment  Patient's communication style: spoken language (English or Bilingual)    Hearing Difficulty or Deaf: no        Cognitive  Cognitive/Neuro/Behavioral: .WDL except (at times disoriented to place and situation)  Level of Consciousness: alert  Arousal Level: arouses to voice, opens eyes spontaneously  Orientation: disoriented to, time  Mood/Behavior: calm, cooperative, behavior appropriate to situation  Best Language: 0 - No aphasia  Speech: clear, logical    Living Environment:   People in home: facility resident     Current living Arrangements: group home ([facility] )      Able to return to prior arrangements: yes       Family/Social Support:  Care provided by: self, other (see comments)  Provides care for:    Marital Status: Single  Sibling(s) (Mabel who is also her Guardian)          Description of Support System: supportive and at bedside          Current Resources:   Patient receiving home care services: Yes  Skilled Home Care Services: Skilled Nursing, Physical Therapy, Occupational Therapy  Community Resources: Home Care, Group Home  Equipment currently used at home:  Lucía Lift,   Supplies currently used at home: Incontinence Supplies, Oxygen Tubing/Supplies    Employment/Financial:  Employment Status: disabled        Financial Concerns: No concerns identified           Lifestyle & Psychosocial Needs:  Social Determinants of Health     Tobacco Use: Not on file   Alcohol Use: Not on file   Financial Resource Strain: Not on file   Food Insecurity: Not on file    Transportation Needs: Not on file   Physical Activity: Not on file   Stress: Not on file   Social Connections: Not on file   Intimate Partner Violence: Not on file   Depression: Not on file   Housing Stability: Not on file       Functional Status:  Prior to admission patient needed assistance:   Dependent ADLs:: Bathing, Dressing, Grooming, Incontinence, Positioning, Transfers, Wheelchair-with assist, Toileting  Dependent IADLs:: Cleaning, Cooking, Laundry, Shopping, Meal Preparation, Medication Management, Money Management, Transportation       Mental Health Status:  Mental Health Status: No Current Concerns       Chemical Dependency Status:      Unknown.          Values/Beliefs:  Spiritual, Cultural Beliefs, Yarsanism Practices, Values that affect care: no               Additional Information:  Writer was informed that patient is discharging back. Writer called [facility]([phone]) and spoke to Jessica. She wanted writer to fax over orders to [phone]. Writer did so. Writer then spoke to Guardian and patient. They would like a wheelchair ride set as this is what the patient needs to get home. Writer set up wheelchair via [employer] for time of 3:37pm to 4:22pm. Guardian requested bariatric but patient weights 240lbs. Writer relayed to transport which told writer all [employer] transport wheelchairs are bariatric.  Writer called [facility], spoke to Jessica and let her know of patient's  time.      Larisa Chavira RN

## 2023-04-07 NOTE — PROGRESS NOTES
[facility]    Infectious Disease Progress Note    Date of Service : 04/07/2023     Assessment:  67YF who has been hospitalized with chronic diarrhea and was found to have C.diff colitis. She recently completed a 4 week antibiotic course for abdominal wall cellulitis     -C.diff colitis related to antibiotic use  -Diarrhea, chronic with acute worsening  -Hypokalemia  -Hypotension related to volume depletion resolved  -Obesity  -chronic medical conditions -HTN, diabetes, pulmonary HTN, CNS hemorrhage from AVM requiring craniectomy, hx of perionitis     Recommendations:  1. Follow cyclospora/isospora stain.   2. Treat C.diff with oral vancomycin 125 mg QID X 14 days  3. Supportive care    Can discharge from the ID stand point when medically stable  Alina Mariscal MD    Interval History   Feels much better, diarrhea is improving, tolerating oral vancomycin, appetite much better, remained afebrile    Physical Exam   Temp: 98.5  F (36.9  C) Temp src: Oral BP: 120/42 Pulse: 67   Resp: 18 SpO2: 94 % O2 Device: Nasal cannula Oxygen Delivery: 1 LPM (pt wants to wear 1LPM O2 for comfort when sleep due to sleep apnea.)  There were no vitals filed for this visit.  Vital Signs with Ranges  Temp:  [98.5  F (36.9  C)-99  F (37.2  C)] 98.5  F (36.9  C)  Pulse:  [67-80] 67  Resp:  [16-20] 18  BP: ()/(42-64) 120/42  SpO2:  [94 %-96 %] 94 %    GENERAL APPEARANCE:  awake  EYES: Eyes grossly normal to inspection  NECK: no adenopathy  RESP: lungs clear   CV: regular rates and rhythm  ABDOMEN: soft, nontender, ventral hernia and a chronic abdominal wound related to prior drain  MS: extremities normal  SKIN: no suspicious lesions or rashes    Other:    Medications     sodium chloride 75 mL/hr at 04/06/23 0950       atorvastatin  40 mg Oral Daily     artificial tears  1 drop Left Eye 4x Daily     FLUoxetine  30 mg Oral QAM     fluticasone  1 spray Both Nostrils QAM     fluticasone-vilanterol  1 puff Inhalation  Daily     potassium chloride  20 mEq Oral BID w/meals     psyllium   Oral BID w/meals     vancomycin  125 mg Oral 4x Daily       Data   All microbiology laboratory data reviewed.  Recent Labs   Lab Test 04/06/23  0612 04/04/23  1902 11/08/22  1149   WBC 11.5* 11.3* 8.4   HGB 10.7* 12.2 13.2   HCT 33.9* 37.5 42.5   MCV 86 85 85    376 231     Recent Labs   Lab Test 04/06/23  0612 04/05/23  0735 04/04/23  1902   CR 0.59 0.59 0.70     Recent Labs   Lab Test 11/08/22  1149   SED 20     Component      Latest Ref Rng 4/5/2023   Cryptosporidium parvum antigen      Negative  Negative    Giardia lamblia antigen      Negative  Negative            Component      Latest Ref Rng 4/4/2023   Campylobacter group by NAYE      Not Detected  Not Detected    Salmonella species by NAYE      Not Detected  Not Detected    Shigella species by NAYE      Not Detected  Not Detected    Vibrio group by NAYE      Not Detected  Not Detected    Rotavirus A by NAYE      Not Detected  Not Detected    Shiga toxin 1 gene by NAYE      Not Detected  Not Detected    Shiga toxin 2 gene by NAYE      Not Detected  Not Detected    Norovirus I and II by NAYE      Not Detected  Not Detected    Yersinia enterocolitica by NAYE      Not Detected  Not Detected    C. difficile GDH Antigen      Negative  Positive !    C. Difficile Toxin      Negative  Positive !    C Difficile Toxin B by PCR      Negative  Positive !

## 2023-04-07 NOTE — PROGRESS NOTES
Shift: [id number]  Orientation/Cognitive: AOx2, disoriented to place and situation but easily redirectable with some anxiety at times.  Observation Goals (Met/ Not Met): inpatient  Mobility Level/Assist Equipment: A2 lift. Hx of intracerebral hemorrhage.  Fall Risk (Y/N): yes  Behavior Concerns: calm and cooperative.  Pain Management: back pain 7/10 - gave PRN Atarax 50mg, patient asleep.  Tele/VS/O2: VS stable and on 1LPM O2 via NC for comfort. Can be on room air with O2 sat around 94%, clear breath sounds.  ABNL Lab/BG: Cryptosporidium Stain - in process.  Diet: regular  Bowel/Bladder: incontinent BB, had 2 episode of medium amount loose stool, yellowish color.  Skin Concerns: excoriation on   Drains/Devices: PIV - infusing with NS 75ml/hr  Tests/Procedures for next shift: none  Anticipated DC date & active delays: TBD

## 2023-04-07 NOTE — PROGRESS NOTES
Patient feels well.  Tolerating po intake (ate virtually all of AM meal) and stooling much better.  Patient wants to discharge.  I spoke with her guardian also present who is in agreement for discharge.  Will plan d/c back to assisted residence setting.  Vancomycin x 2 weeks prescribed.  She is to continue home O2 as prior and resume prior therapy.  Also recommended proph vanco in future if given abx for any reason.    Full d/c summary to follow

## 2023-04-11 NOTE — DISCHARGE SUMMARY
[facility]    Discharge Summary  Hospitalist    Date of Admission:  4/4/2023  Date of Discharge:  4/7/2023  4:56 PM  Provider:  Michel Kyle DO, [facility]    Discharge Diagnoses   1.  Acute C. Difficile colitis superimposed on prior C. Diff/colonization felt secondary to recent antibiotic use  2.  Acute on chronic loose stools (multifactorial with acute component related to #1)  3.  Hypokalemia  4.  Dehydration with associated hypotension earlier in stay  5.  Nausea/reduced intake related to #1, resolved by discharge    Other medical issues:  Past Medical History:   Diagnosis Date     COPD (chronic obstructive pulmonary disease) (H)     O2 use, 2L     Depression      DM2 (diabetes mellitus, type 2) (H)      Dyslipidemia      HTN (hypertension)      Nontraumatic intraventricular intracerebral hemorrhage (H)     due to AVM     SUBHA (obstructive sleep apnea)      Pulmonary hypertension (H)        History of Present Illness   Estephania Castillo is an 67 year old female who presented with loose stools and dehydration.  Please see the admission history and physical for full details.    Hospital Course   Estephania Castillo was admitted on 4/4/2023.  The following problems were addressed during her hospitalization:    Patient presented with worsening loose stools, reduced intake, dehydration with associated hypotension.  Recently was seen by [facility] (3/27-4/1) for diarrhea where she/guardian declined colonoscopy for further work up to determine etiology.  Appears concern for viral, microscopic, ischemic, IBD, malignancy.  She was started on colestipol and psyllium husk.  Had ongoing issues and repeat C.diff test is positive (PCR+, antigen+, toxin+ on 4/5/23).  Started on Vancomycin PO 4 times daily.  She was hydrated with IVF.  After a couple days of treatment stools improved, nausea/appetite considerably improved.  By day of discharge she was eating well and felt much  better.  She wanted to discharge back to her assisted setting and her guardian present was also in agreement.  ID saw her during her stay and recommended 2 more weeks oral vancomycin.  In addition, I recommended to her and her guardian that with any future antibiotic courses she should be considered for at least twice daily prophylactic oral vancomycin.  Ideally though, antibiotics are avoided unless absolutely essential given recurrent C. Diff risks.  She was also give some potassium replacement and I recommended a recheck next week for the C. Diff and her electrolytes.     Significant Results and Procedures   See Below    Pending Results     Unresulted Labs Ordered in the Past 30 Days of this Admission     No orders found from 3/5/2023 to 4/5/2023.          Code Status   Full Code       Primary Care Physician   ORALIA SANDOVAL    On day of discharge alert/conversant and appeared comfortable.      Discharge Disposition   Assisted home setting    Consultations This Hospital Stay   INFECTIOUS DISEASES IP CONSULT  PHYSICAL THERAPY ADULT IP CONSULT  CARE MANAGEMENT / SOCIAL WORK IP CONSULT    Time Spent on this Encounter   IMichel DO, personally saw the patient today and spent greater than 30 minutes discharging this patient.    Discharge Orders      Reason for your hospital stay    C. Diff Colitis     Activity    Your activity upon discharge: activity as tolerated with assist, recommend resume prior therapy     Resume Home Care Services     When to contact your care team    Call if questions.  Notify provider if fevers, worsening abdominal pain or nausea, worsening diarrhea, other new medical concerns.     Discharge Instructions    In the future recommend anytime she receives antibiotics she be given vancomycin 125 mg twice daily prophylaxis treatment for C. Diff as she has risk of recurrence.     Follow-up and recommended labs and tests     Follow-up with primary provider end of next week.  Recommend basic  metabolic lab panel Wed 4/12 or Thursday 4/13/23 with results to the primary provider.  There is a pending cyclospora/isospora study.  Suspect main issue C. Diff but this study should be followed up by primary provider next week when it results.     Oxygen Adult/Peds    Resume prior ordered O2, was on prior to admission.  Long term further management per primary provider.     Diet    Follow this diet upon discharge: Regular     Discharge Medications   Discharge Medication List as of 4/7/2023  4:43 PM      START taking these medications    Details   vancomycin (VANCOCIN) 125 MG capsule Take 1 capsule (125 mg) by mouth 4 times daily for 14 days, Disp-56 capsule, R-0, E-Prescribe         CONTINUE these medications which have NOT CHANGED    Details   acetaminophen (TYLENOL) 325 MG tablet Take 650 mg by mouth every 6 hours as needed for fever or pain, Historical      albuterol (PROAIR HFA/PROVENTIL HFA/VENTOLIN HFA) 108 (90 Base) MCG/ACT inhaler Inhale 1 puff into the lungs every 6 hours as needed for shortness of breath, Historical      ammonium lactate (LAC-HYDRIN) 12 % external lotion Apply topically daily Apply to legsHistorical      Ascorbic Acid 500 MG CHEW Take 500 mg by mouth daily, Historical      atorvastatin (LIPITOR) 40 MG tablet Take 40 mg by mouth daily, Historical      Dextromethorphan-guaiFENesin (MUCINEX DM MAXIMUM STRENGTH)  MG TB12 Take 1 tablet by mouth 2 times daily as needed, Historical      Dimethicone 5 % CREA Apply topically daily as needed Apply to bottom for skin irritation or reddened skinHistorical      FLUoxetine (PROZAC) 10 MG capsule Take 10 mg by mouth every morning Give with 20mg to equal 30mg, Historical      FLUoxetine 20 MG tablet Take 20 mg by mouth every morning Give with 10mg to equal 30mg, Historical      fluticasone (FLONASE) 50 MCG/ACT nasal spray Spray 1 spray into both nostrils every morning, Historical      fluticasone-salmeterol (WIXELA INHUB) 500-50 MCG/ACT inhaler  Inhale 1 puff into the lungs every 12 hours, Historical      hydrOXYzine (ATARAX) 50 MG tablet Take 50 mg by mouth every 6 hours as needed for anxiety, Historical      Multiple Vitamin (ONE-A-DAY ESSENTIAL) TABS Take 1 tablet by mouth daily, Historical      polyvinyl alcohol (LIQUIFILM TEARS) 1.4 % ophthalmic solution Place 1 drop Into the left eye 4 times daily At 8am, 1pm, 5pm, and 8pm, Historical      potassium chloride (KLOR-CON) 20 MEQ packet Take 20 mEq by mouth 2 times daily (with meals) Mix with liquid and take at 8am and 5pm, Historical      Psyllium 33 % POWD Take 1 packet by mouth 2 times daily (with meals) Mix 3.4grams/1 packet with liquid and take at 8am and 5pm, Historical             Allergies   Allergies   Allergen Reactions     Bupropion Anaphylaxis     Sulfa Drugs      Data   Recent Labs   Lab 04/06/23  0612 04/04/23  1902   WBC 11.5* 11.3*   HGB 10.7* 12.2   HCT 33.9* 37.5   MCV 86 85    376     Recent Labs   Lab 04/06/23  0612 04/05/23  0735 04/04/23  1902    139 138   POTASSIUM 3.2* 3.5 3.6   CHLORIDE 111* 108* 105   CO2 23 26 29   ANIONGAP 6* 5* 4*   GLC 74 78 81   BUN 6.1* 8.1 9.4   CR 0.59 0.59 0.70   GFRESTIMATED >90 >90 >90   EDILIA 7.3* 7.5* 7.7*   MAG  --   --  1.8     04/07/2023 0123 04/07/2023 1332 Cryptosporidium Stain [[id number]]    Stool from Per Rectum    Final result Component Value   Cryptosporidium oocysts Not Found   Cyclospora cayetanenis Not Found   Cystoisospora lakisha Not Found          04/05/2023 2150 04/06/2023 1220 Cryptosporidium and Giardia antigens [[id number]]   Stool from Per Rectum    Final result Component Value   Cryptosporidium parvum antigen Negative   Giardia lamblia antigen Negative          04/04/2023 2012 04/05/2023 0603 Enteric Bacteria and Virus Panel by NAYE Stool [[id number]]    Stool from Per Rectum    Final result Component Value   Campylobacter group Not Detected   Salmonella species Not Detected   Shigella species Not Detected   Vibrio  group Not Detected   Rotavirus Not Detected   Shiga toxin 1 gene Not Detected   Shiga toxin 2 gene Not Detected   Norovirus I and II Not Detected   Yersinia enterocolitica Not Detected          04/04/2023 2012 04/05/2023 0009 C. difficile Toxin B PCR with reflex to C. difficile Antigen and Toxins A/B EIA [[id number]]    (Abnormal)   Stool from Per Rectum    Final result Component Value   C Difficile Toxin B by PCR Positive Abnormal    Detection of C. difficile nucleic acid in stools confirms the presence of these organisms in diarrheal patients but may not indicate that C. difficile is the etiologic agent of the diarrhea. Results from the Xpert C. difficile assay should be interpreted in conjunction with other laboratory and clinical data available to the clinician.     Patients with a positive C. difficile PCR result will receive reflex GDH/Toxin Immunoassay testing. Please interpret the PCR test result in conjunction with GDH/Toxin Immunoassay results and the clinical status of patient.          04/04/2023 2012 04/05/2023 0124 C. difficile Antigen and Toxins A/B by Enzyme Immunoassay [[id number]]    (Abnormal)   Stool from Per Rectum    Final result Component Value   C. difficile GDH Antigen Positive Abnormal    C. difficile Toxin Positive Abnormal

## 2023-05-23 ENCOUNTER — LAB REQUISITION (OUTPATIENT)
Dept: LAB | Facility: CLINIC | Age: 68
End: 2023-05-23
Payer: COMMERCIAL

## 2023-05-23 DIAGNOSIS — T81.89XD OTHER COMPLICATIONS OF PROCEDURES, NOT ELSEWHERE CLASSIFIED, SUBSEQUENT ENCOUNTER: ICD-10-CM

## 2023-05-23 PROCEDURE — 87077 CULTURE AEROBIC IDENTIFY: CPT | Mod: ORL | Performed by: PHYSICIAN ASSISTANT

## 2023-05-26 LAB
BACTERIA WND CULT: ABNORMAL
BACTERIA WND CULT: ABNORMAL

## 2023-06-01 ENCOUNTER — HEALTH MAINTENANCE LETTER (OUTPATIENT)
Age: 68
End: 2023-06-01

## 2023-08-25 ENCOUNTER — HOSPITAL ENCOUNTER (INPATIENT)
Facility: CLINIC | Age: 68
LOS: 2 days | Discharge: HOME HEALTH | DRG: 603 | End: 2023-08-28
Attending: EMERGENCY MEDICINE | Admitting: HOSPITALIST
Payer: COMMERCIAL

## 2023-08-25 ENCOUNTER — MEDICAL CORRESPONDENCE (OUTPATIENT)
Dept: HEALTH INFORMATION MANAGEMENT | Facility: CLINIC | Age: 68
End: 2023-08-25

## 2023-08-25 DIAGNOSIS — S31.109A CHRONIC WOUND INFECTION OF ABDOMEN, INITIAL ENCOUNTER: Primary | ICD-10-CM

## 2023-08-25 DIAGNOSIS — L03.313 CELLULITIS OF CHEST WALL: ICD-10-CM

## 2023-08-25 DIAGNOSIS — L08.9 CHRONIC WOUND INFECTION OF ABDOMEN, INITIAL ENCOUNTER: Primary | ICD-10-CM

## 2023-08-25 LAB
ANION GAP SERPL CALCULATED.3IONS-SCNC: 11 MMOL/L (ref 7–15)
BASOPHILS # BLD AUTO: 0 10E3/UL (ref 0–0.2)
BASOPHILS NFR BLD AUTO: 0 %
BUN SERPL-MCNC: 9.7 MG/DL (ref 8–23)
CALCIUM SERPL-MCNC: 8.5 MG/DL (ref 8.8–10.2)
CHLORIDE SERPL-SCNC: 103 MMOL/L (ref 98–107)
CREAT SERPL-MCNC: 0.66 MG/DL (ref 0.51–0.95)
DEPRECATED HCO3 PLAS-SCNC: 23 MMOL/L (ref 22–29)
EOSINOPHIL # BLD AUTO: 0.1 10E3/UL (ref 0–0.7)
EOSINOPHIL NFR BLD AUTO: 1 %
ERYTHROCYTE [DISTWIDTH] IN BLOOD BY AUTOMATED COUNT: 13.1 % (ref 10–15)
GFR SERPL CREATININE-BSD FRML MDRD: >90 ML/MIN/1.73M2
GLUCOSE BLDC GLUCOMTR-MCNC: 121 MG/DL (ref 70–99)
GLUCOSE SERPL-MCNC: 161 MG/DL (ref 70–99)
HCT VFR BLD AUTO: 36.5 % (ref 35–47)
HGB BLD-MCNC: 11.9 G/DL (ref 11.7–15.7)
IMM GRANULOCYTES # BLD: 0.1 10E3/UL
IMM GRANULOCYTES NFR BLD: 1 %
LACTATE SERPL-SCNC: 2 MMOL/L (ref 0.7–2)
LYMPHOCYTES # BLD AUTO: 1.8 10E3/UL (ref 0.8–5.3)
LYMPHOCYTES NFR BLD AUTO: 18 %
MCH RBC QN AUTO: 29 PG (ref 26.5–33)
MCHC RBC AUTO-ENTMCNC: 32.6 G/DL (ref 31.5–36.5)
MCV RBC AUTO: 89 FL (ref 78–100)
MONOCYTES # BLD AUTO: 0.6 10E3/UL (ref 0–1.3)
MONOCYTES NFR BLD AUTO: 6 %
NEUTROPHILS # BLD AUTO: 7.1 10E3/UL (ref 1.6–8.3)
NEUTROPHILS NFR BLD AUTO: 74 %
NRBC # BLD AUTO: 0 10E3/UL
NRBC BLD AUTO-RTO: 0 /100
PLATELET # BLD AUTO: 254 10E3/UL (ref 150–450)
POTASSIUM SERPL-SCNC: 3.5 MMOL/L (ref 3.4–5.3)
RBC # BLD AUTO: 4.11 10E6/UL (ref 3.8–5.2)
SODIUM SERPL-SCNC: 137 MMOL/L (ref 136–145)
WBC # BLD AUTO: 9.7 10E3/UL (ref 4–11)

## 2023-08-25 PROCEDURE — 96365 THER/PROPH/DIAG IV INF INIT: CPT

## 2023-08-25 PROCEDURE — 999N000128 HC STATISTIC PERIPHERAL IV START W/O US GUIDANCE

## 2023-08-25 PROCEDURE — 99285 EMERGENCY DEPT VISIT HI MDM: CPT | Mod: 25

## 2023-08-25 PROCEDURE — 99222 1ST HOSP IP/OBS MODERATE 55: CPT | Mod: AI | Performed by: NURSE PRACTITIONER

## 2023-08-25 PROCEDURE — 87040 BLOOD CULTURE FOR BACTERIA: CPT | Performed by: EMERGENCY MEDICINE

## 2023-08-25 PROCEDURE — 80048 BASIC METABOLIC PNL TOTAL CA: CPT | Performed by: EMERGENCY MEDICINE

## 2023-08-25 PROCEDURE — 85025 COMPLETE CBC W/AUTO DIFF WBC: CPT | Performed by: EMERGENCY MEDICINE

## 2023-08-25 PROCEDURE — 999N000040 HC STATISTIC CONSULT NO CHARGE VASC ACCESS

## 2023-08-25 PROCEDURE — 250N000013 HC RX MED GY IP 250 OP 250 PS 637: Performed by: EMERGENCY MEDICINE

## 2023-08-25 PROCEDURE — 96366 THER/PROPH/DIAG IV INF ADDON: CPT

## 2023-08-25 PROCEDURE — G0378 HOSPITAL OBSERVATION PER HR: HCPCS

## 2023-08-25 PROCEDURE — 250N000013 HC RX MED GY IP 250 OP 250 PS 637: Performed by: NURSE PRACTITIONER

## 2023-08-25 PROCEDURE — 250N000011 HC RX IP 250 OP 636: Mod: JZ | Performed by: EMERGENCY MEDICINE

## 2023-08-25 PROCEDURE — 82962 GLUCOSE BLOOD TEST: CPT

## 2023-08-25 PROCEDURE — 83605 ASSAY OF LACTIC ACID: CPT | Performed by: EMERGENCY MEDICINE

## 2023-08-25 PROCEDURE — 36415 COLL VENOUS BLD VENIPUNCTURE: CPT | Performed by: EMERGENCY MEDICINE

## 2023-08-25 RX ORDER — VANCOMYCIN HYDROCHLORIDE 125 MG/1
125 CAPSULE ORAL 4 TIMES DAILY
Status: ON HOLD | COMMUNITY
End: 2023-08-28

## 2023-08-25 RX ORDER — NYSTATIN 100000 [USP'U]/G
POWDER TOPICAL DAILY PRN
Status: ON HOLD | COMMUNITY
End: 2023-08-28

## 2023-08-25 RX ORDER — FLUTICASONE PROPIONATE 50 MCG
1 SPRAY, SUSPENSION (ML) NASAL EVERY MORNING
Status: DISCONTINUED | OUTPATIENT
Start: 2023-08-26 | End: 2023-08-28 | Stop reason: HOSPADM

## 2023-08-25 RX ORDER — ONDANSETRON 2 MG/ML
4 INJECTION INTRAMUSCULAR; INTRAVENOUS EVERY 6 HOURS PRN
Status: DISCONTINUED | OUTPATIENT
Start: 2023-08-25 | End: 2023-08-28 | Stop reason: HOSPADM

## 2023-08-25 RX ORDER — CLINDAMYCIN HCL 300 MG
300 CAPSULE ORAL 4 TIMES DAILY
Status: ON HOLD | COMMUNITY
End: 2023-08-28

## 2023-08-25 RX ORDER — ACETAMINOPHEN 325 MG/1
975 TABLET ORAL EVERY 8 HOURS
Status: DISCONTINUED | OUTPATIENT
Start: 2023-08-25 | End: 2023-08-28 | Stop reason: HOSPADM

## 2023-08-25 RX ORDER — CEFAZOLIN SODIUM 2 G/100ML
2 INJECTION, SOLUTION INTRAVENOUS ONCE
Status: COMPLETED | OUTPATIENT
Start: 2023-08-25 | End: 2023-08-25

## 2023-08-25 RX ORDER — AMPICILLIN AND SULBACTAM 2; 1 G/1; G/1
3 INJECTION, POWDER, FOR SOLUTION INTRAMUSCULAR; INTRAVENOUS ONCE
Status: DISCONTINUED | OUTPATIENT
Start: 2023-08-25 | End: 2023-08-25

## 2023-08-25 RX ORDER — CEFAZOLIN SODIUM 1 G/3ML
1 INJECTION, POWDER, FOR SOLUTION INTRAMUSCULAR; INTRAVENOUS EVERY 8 HOURS
Status: DISCONTINUED | OUTPATIENT
Start: 2023-08-26 | End: 2023-08-26

## 2023-08-25 RX ORDER — ACETAMINOPHEN 500 MG
1000 TABLET ORAL ONCE
Status: COMPLETED | OUTPATIENT
Start: 2023-08-25 | End: 2023-08-25

## 2023-08-25 RX ORDER — NICOTINE POLACRILEX 4 MG
15-30 LOZENGE BUCCAL
Status: DISCONTINUED | OUTPATIENT
Start: 2023-08-25 | End: 2023-08-28 | Stop reason: HOSPADM

## 2023-08-25 RX ORDER — FLUOXETINE 20 MG/1
20 TABLET, FILM COATED ORAL EVERY MORNING
Status: DISCONTINUED | OUTPATIENT
Start: 2023-08-26 | End: 2023-08-25

## 2023-08-25 RX ORDER — LACTOBACILLUS RHAMNOSUS GG 10B CELL
1 CAPSULE ORAL 2 TIMES DAILY
COMMUNITY

## 2023-08-25 RX ORDER — DEXTROSE MONOHYDRATE 25 G/50ML
25-50 INJECTION, SOLUTION INTRAVENOUS
Status: DISCONTINUED | OUTPATIENT
Start: 2023-08-25 | End: 2023-08-28 | Stop reason: HOSPADM

## 2023-08-25 RX ORDER — FLUOXETINE 10 MG/1
10 CAPSULE ORAL EVERY MORNING
Status: DISCONTINUED | OUTPATIENT
Start: 2023-08-26 | End: 2023-08-25

## 2023-08-25 RX ORDER — ONDANSETRON 4 MG/1
4 TABLET, ORALLY DISINTEGRATING ORAL EVERY 6 HOURS PRN
Status: DISCONTINUED | OUTPATIENT
Start: 2023-08-25 | End: 2023-08-28 | Stop reason: HOSPADM

## 2023-08-25 RX ORDER — VANCOMYCIN HYDROCHLORIDE 125 MG/1
125 CAPSULE ORAL 4 TIMES DAILY
Status: DISCONTINUED | OUTPATIENT
Start: 2023-08-25 | End: 2023-08-28 | Stop reason: HOSPADM

## 2023-08-25 RX ORDER — ALBUTEROL SULFATE 90 UG/1
1 AEROSOL, METERED RESPIRATORY (INHALATION) EVERY 6 HOURS PRN
Status: DISCONTINUED | OUTPATIENT
Start: 2023-08-25 | End: 2023-08-28 | Stop reason: HOSPADM

## 2023-08-25 RX ORDER — CEFTRIAXONE 1 G/1
1 INJECTION, POWDER, FOR SOLUTION INTRAMUSCULAR; INTRAVENOUS EVERY 24 HOURS
Status: CANCELLED | OUTPATIENT
Start: 2023-08-25

## 2023-08-25 RX ADMIN — ACETAMINOPHEN 1000 MG: 500 TABLET, FILM COATED ORAL at 18:06

## 2023-08-25 RX ADMIN — ACETAMINOPHEN 975 MG: 325 TABLET, FILM COATED ORAL at 21:09

## 2023-08-25 RX ADMIN — VANCOMYCIN HYDROCHLORIDE 125 MG: 125 CAPSULE ORAL at 21:09

## 2023-08-25 RX ADMIN — CEFAZOLIN SODIUM 2 G: 2 INJECTION, SOLUTION INTRAVENOUS at 16:38

## 2023-08-25 ASSESSMENT — ACTIVITIES OF DAILY LIVING (ADL)
ADLS_ACUITY_SCORE: 37
ADLS_ACUITY_SCORE: 37
ADLS_ACUITY_SCORE: 45
ADLS_ACUITY_SCORE: 37
ADLS_ACUITY_SCORE: 37

## 2023-08-25 NOTE — PHARMACY-ADMISSION MEDICATION HISTORY
Pharmacist Admission Medication History    Admission medication history is complete. The information provided in this note is only as accurate as the sources available at the time of the update.    Medication reconciliation/reorder completed by provider prior to medication history? No    Information Source(s):  Group home medication list, patient  via N/A    Pertinent Information: Patient states group home gave morning medications     Changes made to PTA medication list:  Added: vancomycin, clindamycin, probiotic, calmoseptine  Deleted: None  Changed: None    Medication Affordability:  Not including over the counter (OTC) medications, was there a time in the past 3 months when you did not take your medications as prescribed because of cost?: No    Allergies reviewed with patient and updates made in EHR: no    Medication History Completed By: SHARLENE BOOGIE RPH 8/25/2023 5:44 PM    Prior to Admission medications    Medication Sig Last Dose Taking? Auth Provider Long Term End Date   acetaminophen (TYLENOL) 325 MG tablet Take 650 mg by mouth every 6 hours as needed for fever or pain Unknown Yes Unknown, Entered By History     albuterol (PROAIR HFA/PROVENTIL HFA/VENTOLIN HFA) 108 (90 Base) MCG/ACT inhaler Inhale 1 puff into the lungs every 6 hours as needed for shortness of breath Unknown Yes Unknown, Entered By History     ammonium lactate (LAC-HYDRIN) 12 % external lotion Apply topically daily Apply to legs 8/25/2023 Yes Unknown, Entered By History     Ascorbic Acid 500 MG CHEW Take 500 mg by mouth daily 8/25/2023 Yes Unknown, Entered By History     atorvastatin (LIPITOR) 40 MG tablet Take 40 mg by mouth daily 8/25/2023 at AM Yes Unknown, Entered By History     clindamycin (CLEOCIN) 300 MG capsule Take 300 mg by mouth 4 times daily 8/25/2023 at AM Yes Unknown, Entered By History     Dextromethorphan-guaiFENesin (MUCINEX DM MAXIMUM STRENGTH)  MG TB12 Take 1 tablet by mouth 2 times daily as needed Unknown Yes  Unknown, Entered By History     Dimethicone 5 % CREA Apply topically daily as needed Apply to bottom for skin irritation or reddened skin Unknown Yes Unknown, Entered By History     FLUoxetine (PROZAC) 10 MG capsule Take 10 mg by mouth every morning Give with 20mg to equal 30mg 8/25/2023 at AM Yes Unknown, Entered By History     FLUoxetine 20 MG tablet Take 20 mg by mouth every morning Give with 10mg to equal 30mg 8/25/2023 at AM Yes Unknown, Entered By History     fluticasone (FLONASE) 50 MCG/ACT nasal spray Spray 1 spray into both nostrils every morning 8/25/2023 at AM Yes Unknown, Entered By History     fluticasone-salmeterol (WIXELA INHUB) 500-50 MCG/ACT inhaler Inhale 1 puff into the lungs every 12 hours 8/25/2023 at AM Yes Unknown, Entered By History Yes 10/5/23   hydrOXYzine (ATARAX) 50 MG tablet Take 50 mg by mouth every 6 hours as needed for anxiety Unknown Yes Unknown, Entered By History     lactobacillus rhamnosus, GG, (CULTURELL) capsule Take 1 capsule by mouth 2 times daily 8/25/2023 at AM Yes Unknown, Entered By History     menthol-zinc oxide (CALMOSEPTINE) 0.44-20.6 % OINT ointment Apply topically 2 times daily as needed for skin protection Unknown Yes Unknown, Entered By History     Multiple Vitamin (ONE-A-DAY ESSENTIAL) TABS Take 1 tablet by mouth daily 8/25/2023 at AM Yes Unknown, Entered By History     nystatin (MYCOSTATIN) 623269 UNIT/GM external powder Apply topically daily as needed  Yes Unknown, Entered By History     polyvinyl alcohol (LIQUIFILM TEARS) 1.4 % ophthalmic solution Place 1 drop Into the left eye 4 times daily At 8am, 1pm, 5pm, and 8pm 8/25/2023 at AM Yes Unknown, Entered By History     potassium chloride (KLOR-CON) 20 MEQ packet Take 20 mEq by mouth 2 times daily (with meals) Mix with liquid and take at 8am and 5pm 8/25/2023 at AM Yes Unknown, Entered By History     Psyllium 33 % POWD Take 1 packet by mouth 2 times daily (with meals) Mix 3.4grams/1 packet with liquid and take at  8am and 5pm 8/25/2023 at AM Yes Unknown, Entered By History     sodium chloride (OCEAN) 0.65 % nasal spray Spray 1 spray into both nostrils daily as needed for congestion  Yes Unknown, Entered By History     vancomycin (VANCOCIN) 125 MG capsule Take 125 mg by mouth 4 times daily 8/25/2023 at AM Yes Unknown, Entered By History     Vitamin D3 (CHOLECALCIFEROL) 125 MCG (5000 UT) tablet Take 1 tablet by mouth daily 8/25/2023 at AM Yes Unknown, Entered By History

## 2023-08-25 NOTE — ED TRIAGE NOTES
?cellulitis - chest - pt arrives via EMS from group home - staff concern for increase cellulitis to chest - provider at group thinks pt needs to be on IV antibiotics - VSS no fever reported - hx of asthma paramedics report some wheezes gave pt a duoneb 93% up to 99% on room air

## 2023-08-25 NOTE — ED NOTES
[facility]  ED Nurse Handoff Report    ED Chief complaint: Wound Check      ED Diagnosis:   Final diagnoses:   Cellulitis of chest wall       Code Status: To be addressed by admitting provider    Allergies:   Allergies   Allergen Reactions    Bupropion Anaphylaxis    Sulfa Antibiotics        Patient Story: Pt brought in from facility for extensive spreading rash on chest. Pt stated rash started a few days ago and despite staff monitoring, has continued to spread diffusely on breasts, chest, and upper back. Pt also has wound on abdomen.  Focused Assessment:  Alert and oriented X 4, forgetful d/t recent TBI. Extensive rash present, tender to touch. Pt utilizing APAP for pain. Wheelchair bound at baseline. First dose IV abx given. IV placement very difficult on pt. Sister present at bedside.     Treatments and/or interventions provided: Labs, abx  Patient's response to treatments and/or interventions: TBD    To be done/followed up on inpatient unit:  Admission orders    Does this patient have any cognitive concerns?: Forgetful    Activity level - Baseline/Home:  Wheelchair  Activity Level - Current:   Wheelchair    Patient's Preferred language: English   Needed?: No    Isolation: None  Infection: Not Applicable  Patient tested for COVID 19 prior to admission: NO  Bariatric?: Yes    Vital Signs:   Vitals:    08/25/23 1414   BP: 102/62   BP Location: Right arm   Pulse: 88   Resp: 20   Temp: (!) 100.5  F (38.1  C)   TempSrc: Oral   SpO2: 92%       Cardiac Rhythm:     Was the PSS-3 completed:   Yes  What interventions are required if any?               Family Comments: Sister present at bedside  OBS brochure/video discussed/provided to patient/family: Yes              Name of person given brochure if not patient: na-given to pt but sister also read.              Relationship to patient: pt/sister    For the majority of the shift this patient's behavior was Green.   Behavioral interventions  performed were na.    ED NURSE PHONE NUMBER: [phone]

## 2023-08-25 NOTE — H&P
[facility]    History and Physical - Hospitalist Service       Date of Admission:  8/25/2023    Assessment & Plan      Estephania Castillo is a 68 year old female admitted on 8/25/2023. She has a PMHx including HTN, O2 dependent COPD, DM2, C.diff colonization (PCR+, antigen+, toxin- on 3/28/23), Pulmonary hypertension, Dyslipidemia, Intraventricular hemorrhage from AVM, nonhealing abdominal wound, chronic hernia, depression who presents to the ED for worsening rash across her chest.     Cellulitis  /62 (BP Location: Right arm)   Pulse 88   Temp (!) 100.5  F (38.1  C) (Oral)   Resp 20   SpO2 92%   Reassuring hemodynamics, lactic acid 2.0, WBC 9.7.  Does not meet sepsis criteria.  Blood cultures collected in the emergency department.  - cefazolin IV   -Outline borders of infection    Recurrent acute on chronic diarrhea  C.diff colonization (PCR+, antigen+, toxin- on 3/28/23)  Seen by [facility] (3/27-4/1) for diarrhea where she declined colonoscopy for further work up to determine etiology (concern for viral, microscopic, ischemic, IBD, malignancy) and started on colestipol and psyllium husk. Noted to have c.diff colonization but not likely active infection.   -Continue vancomycin p.o.    Nonhealing abdominal wound  Follows with infectious disease. She had a PEG tube placed. During this stay the PEG was dislodged, resulting in tube feeds entering the subcutaneous space, and cellulitis. She had an ex lap and washout on 6/5/21. The PEG was removed. There was no significant intra-abdominal infection seen. There was a significant collection of tube feeds in the subcutaneous space under the cellulitis. Several incisions were made down to the fascia and tunnels were made to unify the space. She needed another washout on 6/9/21. Notes report that she developed a chronic wound in the area and the wound never really healed. She also developed a large ventral  incisional hernia. She saw General surgery for this twice in 2023, and they decided to hold off on surgical repair of the hernia because it would be a big surgery and potentially set back her stroke recovery. The wound had almost healed by September 2022, when it looked like a scab. She developed increasing purulent drainage from the wound in November 2022 and the wound started to open up at a superficial level.   -Wound RN consult (patient has specific dressing instructions at her care facility which will need to be obtained)     Chronic hypoxic respiratory failure  Oxygen dependent COPD (2L)  Pulmonary hypertension  Patient denies any acute respiratory complaints.  No cough or increased work of breathing.  -continue chronic O2 therapy at bedtime and PTA inhalers     Hypertension, controlled  -Noted     DM2, diet controlled  Most recent hemoglobin A1c 8/2020 to 6.0%.  -4 times daily glucose monitoring with medium sliding scale     Dyslipidemia: resume statin upon discharge     Intraventricular hemorrhage from AVM: status post suboccipital craniectomy with C1 laminectomy and partial cerebellar tonsil resection with residual speech difficulty and weakness of the lower extremities.   -back to group home with outpatient therapy on discharge  *Sister is guardian, Mabel     Depression  -Continue PTA regimen when verified     Diet: Regular Diet Adult    DVT Prophylaxis: Low Risk/Ambulatory with no VTE prophylaxis indicated and Pneumatic Compression Devices  Peralta Catheter: Not present  Lines: None     Cardiac Monitoring: None  Code Status:  Full code     Clinically Significant Risk Factors Present on Admission                                  Disposition Plan      Expected Discharge Date: 08/26/2023             The patient's care was discussed with the Attending Physician, Dr. Zhang, Bedside Nurse, Patient, and Patient's Family.    ROMANA Harvey [facility]  Hospitalist Service  [facility]  [facility]  Securely message with [employer] (more info)  Text page via [facility] Paging/Directory     ______________________________________________________________________    Chief Complaint   Worsening rash    History is obtained from the patient    History of Present Illness   Estephania Castillo is a 68 year old female who  has a PMHx including HTN, O2 dependent COPD, DM2, C.diff colonization (PCR+, antigen+, toxin- on 3/28/23), Pulmonary hypertension, Dyslipidemia, Intraventricular hemorrhage from AVM, nonhealing abdominal wound, chronic hernia, depression who presents to the ED for worsening rash across her chest.     Patient has been struggling with a rash on her chest at her care facility for the 2 days prior to her admission to the emergency department, despite starting on ciprofloxacin, the rash across her chest extended significantly beyond the marked borders.     I evaluated the patient with her sister/guardian at the bedside. She is eating, drinking and comfort in no apparent distress. She denies any chills, pain, dyspnea, dizziness.       Past Medical History    Past Medical History:   Diagnosis Date    COPD (chronic obstructive pulmonary disease) (H)     O2 use, 2L    Depression     DM2 (diabetes mellitus, type 2) (H)     Dyslipidemia     HTN (hypertension)     Nontraumatic intraventricular intracerebral hemorrhage (H)     due to AVM    SUBHA (obstructive sleep apnea)     Pulmonary hypertension (H)        Past Surgical History   No past surgical history on file.    Prior to Admission Medications   Prior to Admission Medications   Prescriptions Last Dose Informant Patient Reported? Taking?   Ascorbic Acid 500 MG CHEW 8/25/2023  Yes Yes   Sig: Take 500 mg by mouth daily   Dextromethorphan-guaiFENesin (MUCINEX DM MAXIMUM STRENGTH)  MG TB12 Unknown  Yes Yes   Sig: Take 1 tablet by mouth 2 times daily as needed   Dimethicone 5 % CREA Unknown  Yes Yes   Sig: Apply topically daily as needed Apply to bottom for  skin irritation or reddened skin   FLUoxetine (PROZAC) 10 MG capsule 8/25/2023 at AM  Yes Yes   Sig: Take 10 mg by mouth every morning Give with 20mg to equal 30mg   FLUoxetine 20 MG tablet 8/25/2023 at AM  Yes Yes   Sig: Take 20 mg by mouth every morning Give with 10mg to equal 30mg   Multiple Vitamin (ONE-A-DAY ESSENTIAL) TABS 8/25/2023 at AM  Yes Yes   Sig: Take 1 tablet by mouth daily   Psyllium 33 % POWD 8/25/2023 at AM  Yes Yes   Sig: Take 1 packet by mouth 2 times daily (with meals) Mix 3.4grams/1 packet with liquid and take at 8am and 5pm   Vitamin D3 (CHOLECALCIFEROL) 125 MCG (5000 UT) tablet 8/25/2023 at AM  Yes Yes   Sig: Take 1 tablet by mouth daily   acetaminophen (TYLENOL) 325 MG tablet Unknown  Yes Yes   Sig: Take 650 mg by mouth every 6 hours as needed for fever or pain   albuterol (PROAIR HFA/PROVENTIL HFA/VENTOLIN HFA) 108 (90 Base) MCG/ACT inhaler Unknown  Yes Yes   Sig: Inhale 1 puff into the lungs every 6 hours as needed for shortness of breath   ammonium lactate (LAC-HYDRIN) 12 % external lotion 8/25/2023  Yes Yes   Sig: Apply topically daily Apply to legs   atorvastatin (LIPITOR) 40 MG tablet 8/25/2023 at AM  Yes Yes   Sig: Take 40 mg by mouth daily   clindamycin (CLEOCIN) 300 MG capsule 8/25/2023 at AM  Yes Yes   Sig: Take 300 mg by mouth 4 times daily   fluticasone (FLONASE) 50 MCG/ACT nasal spray 8/25/2023 at AM  Yes Yes   Sig: Spray 1 spray into both nostrils every morning   fluticasone-salmeterol (WIXELA INHUB) 500-50 MCG/ACT inhaler 8/25/2023 at AM  Yes Yes   Sig: Inhale 1 puff into the lungs every 12 hours   hydrOXYzine (ATARAX) 50 MG tablet Unknown  Yes Yes   Sig: Take 50 mg by mouth every 6 hours as needed for anxiety   lactobacillus rhamnosus, GG, (CULTURELL) capsule 8/25/2023 at AM  Yes Yes   Sig: Take 1 capsule by mouth 2 times daily   menthol-zinc oxide (CALMOSEPTINE) 0.44-20.6 % OINT ointment Unknown  Yes Yes   Sig: Apply topically 2 times daily as needed for skin protection    nystatin (MYCOSTATIN) 488890 UNIT/GM external powder   Yes Yes   Sig: Apply topically daily as needed   polyvinyl alcohol (LIQUIFILM TEARS) 1.4 % ophthalmic solution 8/25/2023 at AM  Yes Yes   Sig: Place 1 drop Into the left eye 4 times daily At 8am, 1pm, 5pm, and 8pm   potassium chloride (KLOR-CON) 20 MEQ packet 8/25/2023 at AM  Yes Yes   Sig: Take 20 mEq by mouth 2 times daily (with meals) Mix with liquid and take at 8am and 5pm   sodium chloride (OCEAN) 0.65 % nasal spray   Yes Yes   Sig: Spray 1 spray into both nostrils daily as needed for congestion   vancomycin (VANCOCIN) 125 MG capsule 8/25/2023 at AM  Yes Yes   Sig: Take 125 mg by mouth 4 times daily      Facility-Administered Medications: None           Physical Exam   Vital Signs: Temp: (!) 100.5  F (38.1  C) Temp src: Oral BP: 102/62 Pulse: 88   Resp: 20 SpO2: 92 % O2 Device: None (Room air)    Weight: 0 lbs 0 oz    Physical Exam  Vitals and nursing note reviewed.   Constitutional:       General: She is not in acute distress.     Appearance: Normal appearance. She is not ill-appearing or toxic-appearing.   HENT:      Head: Atraumatic.      Mouth/Throat:      Mouth: Mucous membranes are moist.   Eyes:      Pupils: Pupils are equal, round, and reactive to light.   Cardiovascular:      Rate and Rhythm: Normal rate and regular rhythm.   Pulmonary:      Effort: Pulmonary effort is normal.      Breath sounds: Normal breath sounds.   Abdominal:      General: Abdomen is flat.      Palpations: Abdomen is soft.   Skin:     General: Skin is warm and dry.      Capillary Refill: Capillary refill takes less than 2 seconds.      Findings: Wound present.             Comments: Non healing wound w/ mepilex applied.     Rash across chest, tender, hot/erythema extending across chest, into lateral sides of both breasts. Fungal rash below right breast.    Neurological:      General: No focal deficit present.      Mental Status: She is alert. Mental status is at baseline.        Medical Decision Making       64 MINUTES SPENT BY ME on the date of service doing chart review, history, exam, documentation & further activities per the note.      Data     I have personally reviewed the following data over the past 24 hrs:    9.7  \   11.9   / 254     137 103 9.7 /  161 (H)   3.5 23 0.66 \     Procal: N/A CRP: N/A Lactic Acid: 2.0         Imaging results reviewed over the past 24 hrs:   No results found for this or any previous visit (from the past 24 hour(s)).

## 2023-08-25 NOTE — ED NOTES
Bed: ED21  Expected date:   Expected time:   Means of arrival:   Comments:  [facility] [phone] 68 F cellulitis eta [id number]

## 2023-08-25 NOTE — ED PROVIDER NOTES
History     Chief Complaint:  Wound Check       HPI   Estephania Castillo is a 68 year old female who currently is on antibiotics at home for cellulitis for the past 2 days.  They sent her in from her facility as the area of erythema is know cross her entire chest wall.  She does have fever at home.  There has been no drainage or discharge.  No cough cold runny nose or other complaints.  No nausea or vomiting.  Not complaining of pain.      Independent Historian:    The patient    Review of External Notes:  None    Medications:    acetaminophen (TYLENOL) 325 MG tablet  albuterol (PROAIR HFA/PROVENTIL HFA/VENTOLIN HFA) 108 (90 Base) MCG/ACT inhaler  ammonium lactate (LAC-HYDRIN) 12 % external lotion  Ascorbic Acid 500 MG CHEW  atorvastatin (LIPITOR) 40 MG tablet  clindamycin (CLEOCIN) 300 MG capsule  Dextromethorphan-guaiFENesin (MUCINEX DM MAXIMUM STRENGTH)  MG TB12  Dimethicone 5 % CREA  FLUoxetine (PROZAC) 10 MG capsule  FLUoxetine 20 MG tablet  fluticasone (FLONASE) 50 MCG/ACT nasal spray  fluticasone-salmeterol (WIXELA INHUB) 500-50 MCG/ACT inhaler  hydrOXYzine (ATARAX) 50 MG tablet  lactobacillus rhamnosus, GG, (CULTURELL) capsule  menthol-zinc oxide (CALMOSEPTINE) 0.44-20.6 % OINT ointment  Multiple Vitamin (ONE-A-DAY ESSENTIAL) TABS  nystatin (MYCOSTATIN) 708303 UNIT/GM external powder  polyvinyl alcohol (LIQUIFILM TEARS) 1.4 % ophthalmic solution  potassium chloride (KLOR-CON) 20 MEQ packet  Psyllium 33 % POWD  sodium chloride (OCEAN) 0.65 % nasal spray  vancomycin (VANCOCIN) 125 MG capsule  Vitamin D3 (CHOLECALCIFEROL) 125 MCG (5000 UT) tablet        Past Medical History:    Past Medical History:   Diagnosis Date    COPD (chronic obstructive pulmonary disease) (H)     Depression     DM2 (diabetes mellitus, type 2) (H)     Dyslipidemia     HTN (hypertension)     Nontraumatic intraventricular intracerebral hemorrhage (H)     SUBHA (obstructive sleep apnea)     Pulmonary hypertension (H)        Past  Surgical History:    No past surgical history on file.       Physical Exam   Patient Vitals for the past 24 hrs:   BP Temp Temp src Pulse Resp SpO2   08/25/23 1414 102/62 (!) 100.5  F (38.1  C) Oral 88 20 92 %        Physical Exam  Constitutional: Vital signs reviewed as above  General: Alert, pleasant  HEENT: Moist mucous membranes  Eyes: Pupils are equal, round, and reactive to light.   Neck: Normal range of motion  Cardiovascular: normal rate, Regular rhythm and normal heart sounds.  No MRG  Pulmonary/Chest: Effort normal and breath sounds normal. No respiratory distress. Patient has no wheezes. Patient has no rales.   Gastrointestinal: Soft. Positive bowel sounds. No MRG.  Musculoskeletal/Extremities: Full ROM.  Endo: No pitting edema  Neurological: Alert, no focal deficits.  Skin: Diffuse erythema with redness and warmth across the entire chest including the bilateral breast.  No open areas or abscess.  No drainage or discharge.  Psychiatric: Pleasant        Emergency Department Course       Laboratory:  Labs Ordered and Resulted from Time of ED Arrival to Time of ED Departure   BASIC METABOLIC PANEL - Abnormal       Result Value    Sodium 137      Potassium 3.5      Chloride 103      Carbon Dioxide (CO2) 23      Anion Gap 11      Urea Nitrogen 9.7      Creatinine 0.66      Calcium 8.5 (*)     Glucose 161 (*)     GFR Estimate >90     LACTIC ACID WHOLE BLOOD - Normal    Lactic Acid 2.0     CBC WITH PLATELETS AND DIFFERENTIAL    WBC Count 9.7      RBC Count 4.11      Hemoglobin 11.9      Hematocrit 36.5      MCV 89      MCH 29.0      MCHC 32.6      RDW 13.1      Platelet Count 254      % Neutrophils 74      % Lymphocytes 18      % Monocytes 6      % Eosinophils 1      % Basophils 0      % Immature Granulocytes 1      NRBCs per 100 WBC 0      Absolute Neutrophils 7.1      Absolute Lymphocytes 1.8      Absolute Monocytes 0.6      Absolute Eosinophils 0.1      Absolute Basophils 0.0      Absolute Immature  Granulocytes 0.1      Absolute NRBCs 0.0     BLOOD CULTURE   BLOOD CULTURE       Emergency Department Course & Assessments:      Interventions:  Medications   ceFAZolin (ANCEF) 2 g in 100 mL D5W intermittent infusion (2 g Intravenous $New Bag 8/25/23 1630)   acetaminophen (TYLENOL) tablet 1,000 mg (1,000 mg Oral $Given 8/25/23 1806)       Social Determinants of Health affecting care:  None     Disposition:  The patient was admitted to the hospital under the care of the hospitalist service.    Impression & Plan        Medical Decision Making:   Patient presents to the emergency department with concerns over worsening cellulitis to the chest wall.  She does have a fever here of 100.5.  Her white count and lactate are normal.  She is not tachycardic.  She does not hit sepsis criteria based on this however given the large extension of the cellulitis despite p.o. antibiotics for 48 hours at home the plan be to admit her for IV antibiotics.  She was given a dose of Rocephin here.  She is also been given some fluids.  Tylenol has been ordered for pain.  She otherwise feels well and be brought into the abs unit for continued treatment.      Diagnosis:    ICD-10-CM    1. Cellulitis of chest wall  L03.313            Discharge Medications:  New Prescriptions    No medications on file          Miles Reynolds MD  8/25/2023   Miles Reynolds MD Walters, Brent Aaron, MD  08/25/23 1812

## 2023-08-26 LAB
ANION GAP SERPL CALCULATED.3IONS-SCNC: 10 MMOL/L (ref 7–15)
BUN SERPL-MCNC: 10.4 MG/DL (ref 8–23)
CALCIUM SERPL-MCNC: 8.7 MG/DL (ref 8.8–10.2)
CHLORIDE SERPL-SCNC: 106 MMOL/L (ref 98–107)
CREAT SERPL-MCNC: 0.68 MG/DL (ref 0.51–0.95)
CRP SERPL-MCNC: 147.32 MG/L
DEPRECATED HCO3 PLAS-SCNC: 24 MMOL/L (ref 22–29)
ERYTHROCYTE [DISTWIDTH] IN BLOOD BY AUTOMATED COUNT: 13.1 % (ref 10–15)
GFR SERPL CREATININE-BSD FRML MDRD: >90 ML/MIN/1.73M2
GLUCOSE BLDC GLUCOMTR-MCNC: 112 MG/DL (ref 70–99)
GLUCOSE BLDC GLUCOMTR-MCNC: 114 MG/DL (ref 70–99)
GLUCOSE BLDC GLUCOMTR-MCNC: 117 MG/DL (ref 70–99)
GLUCOSE BLDC GLUCOMTR-MCNC: 123 MG/DL (ref 70–99)
GLUCOSE BLDC GLUCOMTR-MCNC: 136 MG/DL (ref 70–99)
GLUCOSE SERPL-MCNC: 109 MG/DL (ref 70–99)
HBA1C MFR BLD: 5.8 %
HCT VFR BLD AUTO: 33.1 % (ref 35–47)
HGB BLD-MCNC: 10.5 G/DL (ref 11.7–15.7)
MCH RBC QN AUTO: 27.7 PG (ref 26.5–33)
MCHC RBC AUTO-ENTMCNC: 31.7 G/DL (ref 31.5–36.5)
MCV RBC AUTO: 87 FL (ref 78–100)
PLATELET # BLD AUTO: 273 10E3/UL (ref 150–450)
POTASSIUM SERPL-SCNC: 3.7 MMOL/L (ref 3.4–5.3)
PROCALCITONIN SERPL IA-MCNC: 0.19 NG/ML
RBC # BLD AUTO: 3.79 10E6/UL (ref 3.8–5.2)
SODIUM SERPL-SCNC: 140 MMOL/L (ref 136–145)
WBC # BLD AUTO: 9.8 10E3/UL (ref 4–11)

## 2023-08-26 PROCEDURE — 250N000013 HC RX MED GY IP 250 OP 250 PS 637

## 2023-08-26 PROCEDURE — 99232 SBSQ HOSP IP/OBS MODERATE 35: CPT | Performed by: HOSPITALIST

## 2023-08-26 PROCEDURE — 94640 AIRWAY INHALATION TREATMENT: CPT

## 2023-08-26 PROCEDURE — 80048 BASIC METABOLIC PNL TOTAL CA: CPT | Performed by: NURSE PRACTITIONER

## 2023-08-26 PROCEDURE — 250N000009 HC RX 250: Performed by: HOSPITALIST

## 2023-08-26 PROCEDURE — 84145 PROCALCITONIN (PCT): CPT | Performed by: HOSPITALIST

## 2023-08-26 PROCEDURE — 250N000013 HC RX MED GY IP 250 OP 250 PS 637: Performed by: HOSPITALIST

## 2023-08-26 PROCEDURE — 250N000013 HC RX MED GY IP 250 OP 250 PS 637: Performed by: NURSE PRACTITIONER

## 2023-08-26 PROCEDURE — 83036 HEMOGLOBIN GLYCOSYLATED A1C: CPT | Performed by: HOSPITALIST

## 2023-08-26 PROCEDURE — 120N000001 HC R&B MED SURG/OB

## 2023-08-26 PROCEDURE — 36415 COLL VENOUS BLD VENIPUNCTURE: CPT | Performed by: NURSE PRACTITIONER

## 2023-08-26 PROCEDURE — 96376 TX/PRO/DX INJ SAME DRUG ADON: CPT

## 2023-08-26 PROCEDURE — 85014 HEMATOCRIT: CPT | Performed by: NURSE PRACTITIONER

## 2023-08-26 PROCEDURE — 250N000011 HC RX IP 250 OP 636: Mod: JZ | Performed by: HOSPITALIST

## 2023-08-26 PROCEDURE — 82962 GLUCOSE BLOOD TEST: CPT

## 2023-08-26 PROCEDURE — 86140 C-REACTIVE PROTEIN: CPT | Performed by: HOSPITALIST

## 2023-08-26 PROCEDURE — G0378 HOSPITAL OBSERVATION PER HR: HCPCS

## 2023-08-26 PROCEDURE — 250N000011 HC RX IP 250 OP 636: Performed by: NURSE PRACTITIONER

## 2023-08-26 RX ORDER — IPRATROPIUM BROMIDE AND ALBUTEROL SULFATE 2.5; .5 MG/3ML; MG/3ML
3 SOLUTION RESPIRATORY (INHALATION) EVERY 4 HOURS PRN
Status: DISCONTINUED | OUTPATIENT
Start: 2023-08-26 | End: 2023-08-28 | Stop reason: HOSPADM

## 2023-08-26 RX ORDER — CEFAZOLIN SODIUM 2 G/100ML
2 INJECTION, SOLUTION INTRAVENOUS EVERY 8 HOURS
Status: DISCONTINUED | OUTPATIENT
Start: 2023-08-26 | End: 2023-08-28 | Stop reason: HOSPADM

## 2023-08-26 RX ORDER — MICONAZOLE NITRATE 20 MG/G
CREAM TOPICAL 2 TIMES DAILY
Status: DISCONTINUED | OUTPATIENT
Start: 2023-08-26 | End: 2023-08-28 | Stop reason: HOSPADM

## 2023-08-26 RX ADMIN — CEFAZOLIN 1 G: 1 INJECTION, POWDER, FOR SOLUTION INTRAMUSCULAR; INTRAVENOUS at 08:24

## 2023-08-26 RX ADMIN — VANCOMYCIN HYDROCHLORIDE 125 MG: 125 CAPSULE ORAL at 15:46

## 2023-08-26 RX ADMIN — CEFAZOLIN SODIUM 2 G: 2 INJECTION, SOLUTION INTRAVENOUS at 15:46

## 2023-08-26 RX ADMIN — VANCOMYCIN HYDROCHLORIDE 125 MG: 125 CAPSULE ORAL at 08:24

## 2023-08-26 RX ADMIN — ACETAMINOPHEN 975 MG: 325 TABLET, FILM COATED ORAL at 06:38

## 2023-08-26 RX ADMIN — VANCOMYCIN HYDROCHLORIDE 125 MG: 125 CAPSULE ORAL at 11:57

## 2023-08-26 RX ADMIN — MICONAZOLE NITRATE: 20 CREAM TOPICAL at 20:39

## 2023-08-26 RX ADMIN — FLUTICASONE PROPIONATE 1 SPRAY: 50 SPRAY, METERED NASAL at 08:25

## 2023-08-26 RX ADMIN — ACETAMINOPHEN 975 MG: 325 TABLET, FILM COATED ORAL at 14:07

## 2023-08-26 RX ADMIN — VANCOMYCIN HYDROCHLORIDE 125 MG: 125 CAPSULE ORAL at 20:39

## 2023-08-26 RX ADMIN — FLUOXETINE 30 MG: 20 CAPSULE ORAL at 08:24

## 2023-08-26 RX ADMIN — IPRATROPIUM BROMIDE AND ALBUTEROL SULFATE 3 ML: .5; 3 SOLUTION RESPIRATORY (INHALATION) at 16:33

## 2023-08-26 RX ADMIN — ACETAMINOPHEN 975 MG: 325 TABLET, FILM COATED ORAL at 22:32

## 2023-08-26 RX ADMIN — CEFAZOLIN 1 G: 1 INJECTION, POWDER, FOR SOLUTION INTRAMUSCULAR; INTRAVENOUS at 00:14

## 2023-08-26 RX ADMIN — MICONAZOLE NITRATE 1 ML: 20 CREAM TOPICAL at 15:47

## 2023-08-26 ASSESSMENT — ACTIVITIES OF DAILY LIVING (ADL)
ADLS_ACUITY_SCORE: 49
ADLS_ACUITY_SCORE: 45
ADLS_ACUITY_SCORE: 49

## 2023-08-26 NOTE — PLAN OF CARE
Pt admitted from ED with increasing area of erythema across chest wall. A/O x4, forgetful at times. VSS on RA ex soft BP, lowest recheck @ 89/53 - CTM. Denies pain, N/V. DEAN noted. Infrequent congestive and productive cough noted with clear phlegm. LS diminished with Expiratory wheezes. Pt incontinent of B/B, had 1 loose stool this shift. Blanchable redness on sacrum/coccyx. Peeling and redness noted underneath bilateral breasts and abdominal fold. Nonhealing wound on abdomen with large inguinal hernia,  UTV abdominal wound, moderate drainae noted on dressing. No increase in erythema noted on chest-  CTM.  AX2, not OOB this shift. Q2hr T&R. PIV S. WOC consulted. Discharge pending.

## 2023-08-26 NOTE — PLAN OF CARE
Goal Outcome Evaluation:           Observation goals  PRIOR TO DISCHARGE        Comments: -diagnostic tests and consults completed and resultednot met  -vital signs normal or at patient baselinenot met  Nurse to notify provider when observation goals have been met and patient is ready for discharge.

## 2023-08-26 NOTE — PROVIDER NOTIFICATION
MD Notification    Notified Person: MD DR Au    Notified Person Name:    Notification Date/Time:8/26/2023    Notification Interaction:webPurpose of Notification:high resp rate    Orders Received:orders receivednebs    Comments:

## 2023-08-26 NOTE — PLAN OF CARE
Orientation/Cognitive: A&Ox4  Observation Goals (Met/ Not Met): Not Met  Mobility Level/Assist Equipment: Asst 2, not oob, T/R q2 hrs  Fall Risk (Y/N): Yes  Behavior Concerns: None  Pain Management: Denies  Tele/VS/O2: BP soft 90s/50s, all other VSS on RA.  ABNL Lab/BG:   Diet: Regular  Bowel/Bladder: Incontinent of bowel and bladder. Loose stool x1.  Skin Concerns: Scattered bruises, redness under abd folds/breasts.  Wound to abdomen.  Drains/Devices: PIV S.L.  Tests/Procedures for next shift: Woc consult  Anticipated DC date & active delays: Discharge pending back to group home.  Patient Stated Goal for Today: none stated             Observation goals  PRIOR TO DISCHARGE        Comments:   -diagnostic tests and consults completed and resulted- NOT MET  -vital signs normal or at patient baseline- MET

## 2023-08-26 NOTE — PLAN OF CARE
Pt transferred to the floor@ 0530, VSS on Ra, dinner ordered, pt refused purwick. . Guardian updated

## 2023-08-26 NOTE — PROVIDER NOTIFICATION
MD Notification    Notified Person: MD    Notified Person Name: Marquis Arellano    Notification Date/Time: 8/25/2023 10:03pm     Notification Interaction: [employer]     Purpose of Notification:Pt's last BP check @ 89/53. Denies any lightheadedness or dizziness. Any changes to plan of care? please advise    Orders Received:    Comments:

## 2023-08-26 NOTE — PROGRESS NOTES
Observation goals  PRIOR TO DISCHARGE        Comments:   -diagnostic tests and consults completed and resulted- NOT MET  -vital signs normal or at patient baseline- MET

## 2023-08-26 NOTE — PROGRESS NOTES
RECEIVING UNIT ED HANDOFF REVIEW    ED Nurse Handoff Report was reviewed by: Kobe Sargent RN on August 25, 2023 at 9:01 PM       Blood pressure report received  Will observe.  SHEMAR HENSON M.D., F.A.C.P

## 2023-08-26 NOTE — PROGRESS NOTES
Observation goals  PRIOR TO DISCHARGE       Comments: -diagnostic tests and consults completed and resulted not met  -vital signs normal or at patient baseline met

## 2023-08-26 NOTE — PLAN OF CARE
"Goal Outcome Evaluation:    DATE & TIME: 8/26/23 [id number]    Cognitive Concerns/ Orientation : Aox4, has legal guardian- Mabel (sister)   BEHAVIOR & AGGRESSION TOOL COLOR: Green  CIWA SCORE: n/a   ABNL VS/O2: Soft BP, other VSS on RA.   MOBILITY: Ax2 Lift, T/R  PAIN MANAGMENT: denies pain, states the rash on her chest just causes some \"discomfort\".  DIET: Reg  BOWEL/BLADDER: Incontinent b/b, Bmx1. Abdomen contour irregular, soft, non-tender.  ABNL LAB/BG: , 114  DRAIN/DEVICES: PIV SL, int abx  TELEMETRY RHYTHM: n/a  SKIN: Rash on upper chest, edges marked. Redness under breasts and abdominal folds, miconazole cream ordered. Wound to abdomen from previous peg tube, covered with mepilex - WOC consult placed. L abdominal hernia.  TESTS/PROCEDURES: woc consult  D/C DAY/GOALS/PLACE: TBD  OTHER IMPORTANT INFO: on PO vanco for hx of cdiff colonization (precautions not needed). Frequent congested/productive cough, LS exp wheezes.           "

## 2023-08-26 NOTE — PROGRESS NOTES
[facility]    Hospitalist Progress Note    Date of Admission:  8/25/2023    Assessment & Plan     Estephania Castillo is a 68 year old female admitted on 8/25/2023. She has a PMHx including HTN, O2 dependent COPD, DM2, C.diff colonization (PCR+, antigen+, toxin- on 3/28/23), Pulmonary hypertension, Dyslipidemia, Intraventricular hemorrhage from AVM, nonhealing abdominal wound, chronic hernia, depression who presents to the ED for worsening rash across her chest.      Chest wall cellulitis  Intertrigo   Patient presented with spreading rash in the front of her chest on her breasts over the last 5 to 7 days.  Appears that she was started on clindamycin but rash continued to spread despite antibiotic.  Reassuring hemodynamics, lactic acid 2.0, WBC 9.7.  Does not meet sepsis criteria.  Blood cultures collected in the emergency department.  -Admit as inpatient  -Continue on cefazolin . Increase to 2 g IV every 8 hours.  Blood cultures with no growth.  -, procalcitonin 0.19  -Miconazole cream for intertriginous areas under breasts     Recurrent acute on chronic diarrhea  C.diff colonization (PCR+, antigen+, toxin- on 3/28/23)  Seen by [facility] (3/27-4/1/23) for diarrhea where she declined colonoscopy for further work up to determine etiology (concern for viral, microscopic, ischemic, IBD, malignancy) and started on colestipol and psyllium husk. Noted to have c.diff colonization but not likely active infection.   -Currently on vancomycin 4 times daily while on antibiotics for C. difficile prophylaxis     Chronic nonhealing abdominal wound  Follows with infectious disease. In May 2021 she was admitted to [facility] for a hemorhagic stroke .She had a PEG tube placed. During this stay the PEG was dislodged, resulting in tube feeds entering the subcutaneous space, and cellulitis. She had an ex lap and washout on 6/5/21. The PEG was removed. There was no significant  intra-abdominal infection seen. There was a significant collection of tube feeds in the subcutaneous space under the cellulitis. Several incisions were made down to the fascia and tunnels were made to unify the space. She needed another washout on 6/9/21. Notes report that she developed a chronic wound in the area and the wound never really healed. She also developed a large ventral incisional hernia. She saw General surgery for this twice in 2023, and they decided to hold off on surgical repair of the hernia because it would be a big surgery and potentially set back her stroke recovery. The wound had almost healed by September 2022, when it looked like a scab. She developed increasing purulent drainage from the wound in November 2022 and the wound started to open up at a superficial level.   -Wound RN consulted (patient has specific dressing instructions at her care facility)     Chronic hypoxic respiratory failure  Oxygen dependent COPD (2L)  Pulmonary hypertension  Patient denies any acute respiratory complaints.  Appears to have chronic cough.  -continue chronic O2 therapy at bedtime and PTA inhalers  -Ordered as needed nebs per patient request     Hypertension, controlled  -Noted     DM2, diet controlled  Most recent hemoglobin A1c 8/2022 to 6.0%.  Recheck A1c.  -4 times daily glucose monitoring with medium dose sliding scale insulin     Dyslipidemia: resume statin upon discharge     H/o Intraventricular hemorrhage from AVM: In May 2021 she was admitted to [facility] for a hemorhagic stroke requiring an ICU stay, ventriculostomy tubes, and craniotomy with C1 laminectomy and partial cerebellar tonsillar resection on 5/15/21.  She undergoes rehab and still has residual speech difficulty and weakness of the lower extremities.   -back to group home with outpatient therapy on discharge.  Family expressing a lot of concern surrounding cares at group home/[facility].  *Sister is guardian, Mabel     Depression  -Continue PTA  "fluoxetine        Diet: Regular Diet Adult    DVT Prophylaxis: Low Risk/Ambulatory with no VTE prophylaxis indicated and Pneumatic Compression Devices  Peralta Catheter: Not present  Lines: None     Cardiac Monitoring: None  Code Status:  Full code          Medical Decision Making       Please see A&P for additional details of medical decision making.        Clinically Significant Risk Factors Present on Admission                       # Overweight: Estimated body mass index is 29.66 kg/m  as calculated from the following:    Height as of this encounter: 1.702 m (5' 7\").    Weight as of this encounter: 85.9 kg (189 lb 6 oz).                Rimma Au MD  Text Page (7am - 6pm, M-F)  [facility]  Securely message with the Vocera Web Console (learn more here)  Text page via [employer]      Interval History   Stable overnight.  Afebrile.  States that rash is a bit itchy on her chest and a little painful.  It has not gone out of the markings.  No chills or sweats.  Sister at bedside.    -Data reviewed today: I reviewed all new labs and imaging results over the last 24 hours. I personally reviewed labs.    Physical Exam   Temp: 98.3  F (36.8  C) Temp src: Oral BP: 102/58 Pulse: 67   Resp: (!) 40 SpO2: 93 % O2 Device: None (Room air)    Vitals:    08/25/23 2141   Weight: 85.9 kg (189 lb 6 oz)     Vital Signs with Ranges  Temp:  [97.6  F (36.4  C)-98.8  F (37.1  C)] 98.3  F (36.8  C)  Pulse:  [66-76] 67  Resp:  [18-40] 40  BP: ()/(51-65) 102/58  SpO2:  [91 %-93 %] 93 %  No intake/output data recorded.    Constitutional: Alert, appears comfortable, in no acute distress  Respiratory: Non labored breathing, clear anteriorly, occasional cough  Cardiovascular: Heart sounds regular rate and rhythm, no murmurs, no leg edema  GI: Abdomen is soft, non distended, non tender.  Wound in anterior abdominal wall that was dressed  Skin/Integumen: See picture  Neuro: alert, converses " appropriately, moving all extremities, fluent speech, no facial asymmetry  Psych: mood and affect appropriate          Medications      acetaminophen  975 mg Oral Q8H    ceFAZolin  2 g Intravenous Q8H    FLUoxetine  30 mg Oral Daily    fluticasone  1 spray Both Nostrils QAM    insulin aspart  1-7 Units Subcutaneous TID AC    insulin aspart  1-5 Units Subcutaneous At Bedtime    miconazole   Topical BID    vancomycin  125 mg Oral 4x Daily       Data   Recent Labs   Lab 08/26/23  1156 08/26/23  0925 08/26/23  0826 08/25/23  2147 08/25/23  1616   WBC  --  9.8  --   --  9.7   HGB  --  10.5*  --   --  11.9   MCV  --  87  --   --  89   PLT  --  273  --   --  254   NA  --  140  --   --  137   POTASSIUM  --  3.7  --   --  3.5   CHLORIDE  --  106  --   --  103   CO2  --  24  --   --  23   BUN  --  10.4  --   --  9.7   CR  --  0.68  --   --  0.66   ANIONGAP  --  10  --   --  11   EDILIA  --  8.7*  --   --  8.5*   * 109* 123*   < > 161*    < > = values in this interval not displayed.       Imaging  No results found for this or any previous visit (from the past 24 hour(s)).

## 2023-08-27 LAB
GLUCOSE BLDC GLUCOMTR-MCNC: 100 MG/DL (ref 70–99)
GLUCOSE BLDC GLUCOMTR-MCNC: 114 MG/DL (ref 70–99)
GLUCOSE BLDC GLUCOMTR-MCNC: 123 MG/DL (ref 70–99)
GLUCOSE BLDC GLUCOMTR-MCNC: 126 MG/DL (ref 70–99)
GLUCOSE BLDC GLUCOMTR-MCNC: 93 MG/DL (ref 70–99)

## 2023-08-27 PROCEDURE — 99232 SBSQ HOSP IP/OBS MODERATE 35: CPT | Performed by: HOSPITALIST

## 2023-08-27 PROCEDURE — 94640 AIRWAY INHALATION TREATMENT: CPT

## 2023-08-27 PROCEDURE — 250N000013 HC RX MED GY IP 250 OP 250 PS 637: Performed by: NURSE PRACTITIONER

## 2023-08-27 PROCEDURE — 250N000011 HC RX IP 250 OP 636: Mod: JZ | Performed by: HOSPITALIST

## 2023-08-27 PROCEDURE — 999N000157 HC STATISTIC RCP TIME EA 10 MIN

## 2023-08-27 PROCEDURE — 120N000001 HC R&B MED SURG/OB

## 2023-08-27 PROCEDURE — 250N000013 HC RX MED GY IP 250 OP 250 PS 637: Performed by: HOSPITALIST

## 2023-08-27 PROCEDURE — 999N000128 HC STATISTIC PERIPHERAL IV START W/O US GUIDANCE

## 2023-08-27 PROCEDURE — 250N000013 HC RX MED GY IP 250 OP 250 PS 637

## 2023-08-27 PROCEDURE — 250N000009 HC RX 250: Performed by: HOSPITALIST

## 2023-08-27 RX ORDER — LOPERAMIDE HCL 2 MG
2 CAPSULE ORAL 4 TIMES DAILY PRN
Status: DISCONTINUED | OUTPATIENT
Start: 2023-08-27 | End: 2023-08-28 | Stop reason: HOSPADM

## 2023-08-27 RX ORDER — LACTOBACILLUS RHAMNOSUS GG 10B CELL
1 CAPSULE ORAL 2 TIMES DAILY
Status: DISCONTINUED | OUTPATIENT
Start: 2023-08-27 | End: 2023-08-28 | Stop reason: HOSPADM

## 2023-08-27 RX ADMIN — VANCOMYCIN HYDROCHLORIDE 125 MG: 125 CAPSULE ORAL at 22:02

## 2023-08-27 RX ADMIN — CEFAZOLIN SODIUM 2 G: 2 INJECTION, SOLUTION INTRAVENOUS at 16:19

## 2023-08-27 RX ADMIN — MICONAZOLE NITRATE: 20 CREAM TOPICAL at 22:05

## 2023-08-27 RX ADMIN — FLUOXETINE 30 MG: 20 CAPSULE ORAL at 09:08

## 2023-08-27 RX ADMIN — IPRATROPIUM BROMIDE AND ALBUTEROL SULFATE 3 ML: .5; 3 SOLUTION RESPIRATORY (INHALATION) at 18:15

## 2023-08-27 RX ADMIN — CEFAZOLIN SODIUM 2 G: 2 INJECTION, SOLUTION INTRAVENOUS at 01:15

## 2023-08-27 RX ADMIN — FLUTICASONE PROPIONATE 1 SPRAY: 50 SPRAY, METERED NASAL at 09:11

## 2023-08-27 RX ADMIN — ACETAMINOPHEN 975 MG: 325 TABLET, FILM COATED ORAL at 16:19

## 2023-08-27 RX ADMIN — VANCOMYCIN HYDROCHLORIDE 125 MG: 125 CAPSULE ORAL at 16:19

## 2023-08-27 RX ADMIN — VANCOMYCIN HYDROCHLORIDE 125 MG: 125 CAPSULE ORAL at 09:08

## 2023-08-27 RX ADMIN — Medication 1 CAPSULE: at 22:03

## 2023-08-27 RX ADMIN — ACETAMINOPHEN 975 MG: 325 TABLET, FILM COATED ORAL at 22:02

## 2023-08-27 RX ADMIN — CEFAZOLIN SODIUM 2 G: 2 INJECTION, SOLUTION INTRAVENOUS at 09:08

## 2023-08-27 RX ADMIN — MICONAZOLE NITRATE: 20 CREAM TOPICAL at 09:10

## 2023-08-27 RX ADMIN — VANCOMYCIN HYDROCHLORIDE 125 MG: 125 CAPSULE ORAL at 13:34

## 2023-08-27 RX ADMIN — ACETAMINOPHEN 975 MG: 325 TABLET, FILM COATED ORAL at 07:11

## 2023-08-27 ASSESSMENT — ACTIVITIES OF DAILY LIVING (ADL)
ADLS_ACUITY_SCORE: 49

## 2023-08-27 NOTE — PROGRESS NOTES
[facility]    Hospitalist Progress Note    Date of Admission:  8/25/2023    Assessment & Plan     Estephania Castillo is a 68 year old female admitted on 8/25/2023. She has a PMHx including HTN, O2 dependent COPD, DM2, C.diff colonization (PCR+, antigen+, toxin- on 3/28/23), Pulmonary hypertension, Dyslipidemia, Intraventricular hemorrhage from AVM, nonhealing abdominal wound, chronic hernia, depression who presents to the ED for worsening rash across her chest.      Chest wall cellulitis  Intertrigo   Patient presented with spreading rash in the front of her chest on her breasts over the last 5 to 7 days.  Appears that she was started on clindamycin but rash continued to spread despite antibiotic.  Reassuring hemodynamics, lactic acid 2.0, WBC 9.7.  Does not meet sepsis criteria.  Blood cultures collected in the emergency department.  -Admit as inpatient  -Continue on cefazolin . Increase to 2 g IV every 8 hours.  Blood cultures with no growth.  -, procalcitonin 0.19  -Miconazole cream for intertriginous areas under breasts  -8/27: Erythema on chest improving, receding from margins     Recurrent acute on chronic diarrhea  C.diff colonization (PCR+, antigen+, toxin- on 3/28/23)  Seen by [facility] at [facility] (3/27-4/1/23) for diarrhea where she declined colonoscopy for further work up to determine etiology (concern for viral, microscopic, ischemic, IBD, malignancy) and started on colestipol and psyllium husk. Noted to have c.diff colonization but not likely active infection.   -Currently on vancomycin 4 times daily while on antibiotics for C. difficile prophylaxis     Chronic nonhealing abdominal wound  Follows with infectious disease. In May 2021 she was admitted to [facility] [place] [facility] for a hemorhagic stroke .She had a PEG tube placed. During this stay the PEG was dislodged, resulting in tube feeds entering the subcutaneous space, and cellulitis. She had an ex lap and washout on  6/5/21. The PEG was removed. There was no significant intra-abdominal infection seen. There was a significant collection of tube feeds in the subcutaneous space under the cellulitis. Several incisions were made down to the fascia and tunnels were made to unify the space. She needed another washout on 6/9/21. Notes report that she developed a chronic wound in the area and the wound never really healed. She also developed a large ventral incisional hernia. She saw General surgery for this twice in 2023, and they decided to hold off on surgical repair of the hernia because it would be a big surgery and potentially set back her stroke recovery. The wound had almost healed by September 2022, when it looked like a scab. She developed increasing purulent drainage from the wound in November 2022 and the wound started to open up at a superficial level.   -Wound RN consulted (patient has specific dressing instructions at her care facility)     Chronic hypoxic respiratory failure  Oxygen dependent COPD (2L)  Pulmonary hypertension  Patient denies any acute respiratory complaints.  Appears to have chronic cough.  -continue chronic O2 therapy at bedtime and PTA inhalers  -Ordered as needed nebs per patient request     Hypertension, controlled  -Noted     DM2, diet controlled  Most recent hemoglobin A1c 8/2022 to 6.0%.  Recheck A1c.  -4 times daily glucose monitoring with medium dose sliding scale insulin     Dyslipidemia: resume statin upon discharge     H/o Intraventricular hemorrhage from AVM: In May 2021 she was admitted to [facility] [place] [place] for a hemorhagic stroke requiring an ICU stay, ventriculostomy tubes, and craniotomy with C1 laminectomy and partial cerebellar tonsillar resection on 5/15/21.  She undergoes rehab and still has residual speech difficulty and weakness of the lower extremities.   -back to group home with outpatient therapy on discharge.  Family expressing a lot of concern surrounding cares at group home/[facility].  *Sister  "is guardian, Mabel     Depression  -Continue PTA fluoxetine        Diet: Regular Diet Adult    DVT Prophylaxis: Low Risk/Ambulatory with no VTE prophylaxis indicated and Pneumatic Compression Devices  Peralta Catheter: Not present  Lines: None     Cardiac Monitoring: None  Code Status:  Full code          Medical Decision Making       Please see A&P for additional details of medical decision making.        Clinically Significant Risk Factors                         # Overweight: Estimated body mass index is 29.28 kg/m  as calculated from the following:    Height as of this encounter: 1.702 m (5' 7\").    Weight as of this encounter: 84.8 kg (186 lb 15.2 oz).  , PRESENT ON ADMISSION              Rimma Au MD  Text Page (7am - 6pm, M-F)  [facility]  Securely message with the Vocera Web Console (learn more here)  Text page via [employer] Paging/Directory      Interval History   Stable overnight.  Afebrile.  No new complaints.  Overall feels better today.  Does not know if she is having loose stools as she is incontinent.  No abdominal pain.    -Data reviewed today: I reviewed all new labs and imaging results over the last 24 hours. I personally reviewed labs.    Physical Exam   Temp: 98.6  F (37  C) Temp src: Oral BP: 98/55 Pulse: 74   Resp: 30 SpO2: 94 % O2 Device: None (Room air)    Vitals:    08/25/23 2141 08/27/23 0624   Weight: 85.9 kg (189 lb 6 oz) 84.8 kg (186 lb 15.2 oz)     Vital Signs with Ranges  Temp:  [98.2  F (36.8  C)-98.7  F (37.1  C)] 98.6  F (37  C)  Pulse:  [67-86] 74  Resp:  [30-40] 30  BP: ()/(52-58) 98/55  SpO2:  [90 %-94 %] 94 %  I/O last 3 completed shifts:  In: 260 [P.O.:260]  Out: -     Constitutional: Alert, appears comfortable, in no acute distress  Respiratory: Non labored breathing, clear anteriorly, occasional cough  Cardiovascular: Heart sounds regular rate and rhythm, no murmurs, no leg edema  GI: Abdomen is soft, non distended, non tender.  Wound in " anterior abdominal wall that was dressed  Skin/Integumen: Erythematous area across anterior chest appears to be improving, getting faint, receding from margins  Neuro: alert, converses appropriately, moving all extremities, fluent speech, no facial asymmetry  Psych: mood and affect appropriate          Medications      acetaminophen  975 mg Oral Q8H    ceFAZolin  2 g Intravenous Q8H    FLUoxetine  30 mg Oral Daily    fluticasone  1 spray Both Nostrils QAM    insulin aspart  1-7 Units Subcutaneous TID AC    insulin aspart  1-5 Units Subcutaneous At Bedtime    miconazole   Topical BID    vancomycin  125 mg Oral 4x Daily       Data   Recent Labs   Lab 08/27/23  1204 08/27/23  0748 08/27/23  0226 08/26/23  1156 08/26/23  0925 08/25/23  2147 08/25/23  1616   WBC  --   --   --   --  9.8  --  9.7   HGB  --   --   --   --  10.5*  --  11.9   MCV  --   --   --   --  87  --  89   PLT  --   --   --   --  273  --  254   NA  --   --   --   --  140  --  137   POTASSIUM  --   --   --   --  3.7  --  3.5   CHLORIDE  --   --   --   --  106  --  103   CO2  --   --   --   --  24  --  23   BUN  --   --   --   --  10.4  --  9.7   CR  --   --   --   --  0.68  --  0.66   ANIONGAP  --   --   --   --  10  --  11   EDILIA  --   --   --   --  8.7*  --  8.5*   GLC 93 100* 123*   < > 109*   < > 161*    < > = values in this interval not displayed.         Imaging  No results found for this or any previous visit (from the past 24 hour(s)).

## 2023-08-27 NOTE — PROGRESS NOTES
Date/Time: 8/27/23 19:00-07:30    POD#: NA  Mental Status: A&O x4  Activity/dangle: up with 2 and lift  Diet: Regular  Pain: Schedule Tylenol  Peralta/Voiding: Incontinent of B&B  Tele/Restraints/Iso: NA  02/LDA: RN. PIV SL  D/C Date: Pending  Other Info:  Rash and redness remain to upper chest, under breat and abdominal folds. Refused dressing change to abdomen and stated that Wound care will take care of it today.

## 2023-08-27 NOTE — PLAN OF CARE
Date/Time: 8/27/23 [id number]   POD#: NA  Mental Status: A&O x4/forgetful @ times  Activity/dangle: up with 2 and lift  Diet: Regular  Pain: Schedule Tylenol  Peralta/Voiding: Incontinent of B&B  Tele/Restraints/Iso: NA  02/LDA: RA/ PIV SL  D/C Date: Pending  Other Info:  Rash and redness upper chest  under breast and abdominal folds improving . Refused dressing change to abdomen & WOC consulted. Pt has x3 loose stool . Imodium available, Congested cough , with wheezing  Neb prn given .

## 2023-08-27 NOTE — PROVIDER NOTIFICATION
.MD Notification    Notified Person: MD    Notified Person Name:Rimma Au     Notification Date/Time:8/27/23 @ 1450    Notification Interaction: Vocera    Purpose of Notification: FYI patient has loose  stool x3 since morning     Orders Received: ordered Probiotic & PRN Imodium     Comments:

## 2023-08-28 VITALS
RESPIRATION RATE: 16 BRPM | TEMPERATURE: 98.2 F | SYSTOLIC BLOOD PRESSURE: 93 MMHG | OXYGEN SATURATION: 96 % | WEIGHT: 186.95 LBS | HEART RATE: 64 BPM | DIASTOLIC BLOOD PRESSURE: 56 MMHG | BODY MASS INDEX: 29.34 KG/M2 | HEIGHT: 67 IN

## 2023-08-28 LAB
GLUCOSE BLDC GLUCOMTR-MCNC: 110 MG/DL (ref 70–99)
GLUCOSE BLDC GLUCOMTR-MCNC: 91 MG/DL (ref 70–99)
GLUCOSE BLDC GLUCOMTR-MCNC: 96 MG/DL (ref 70–99)

## 2023-08-28 PROCEDURE — 99239 HOSP IP/OBS DSCHRG MGMT >30: CPT | Performed by: HOSPITALIST

## 2023-08-28 PROCEDURE — 250N000011 HC RX IP 250 OP 636: Mod: JZ | Performed by: HOSPITALIST

## 2023-08-28 PROCEDURE — G0463 HOSPITAL OUTPT CLINIC VISIT: HCPCS

## 2023-08-28 PROCEDURE — 250N000013 HC RX MED GY IP 250 OP 250 PS 637

## 2023-08-28 PROCEDURE — 250N000013 HC RX MED GY IP 250 OP 250 PS 637: Performed by: HOSPITALIST

## 2023-08-28 PROCEDURE — 250N000013 HC RX MED GY IP 250 OP 250 PS 637: Performed by: NURSE PRACTITIONER

## 2023-08-28 RX ORDER — NYSTATIN 100000 [USP'U]/G
POWDER TOPICAL DAILY PRN
Start: 2023-08-28

## 2023-08-28 RX ORDER — VANCOMYCIN HYDROCHLORIDE 125 MG/1
125 CAPSULE ORAL DAILY
Qty: 14 CAPSULE | Refills: 0 | Status: SHIPPED | OUTPATIENT
Start: 2023-08-28 | End: 2023-08-28

## 2023-08-28 RX ORDER — ACETAMINOPHEN 500 MG
975 TABLET ORAL EVERY 8 HOURS
Qty: 42 TABLET | Refills: 0 | Status: SHIPPED | OUTPATIENT
Start: 2023-08-28 | End: 2023-09-04

## 2023-08-28 RX ORDER — CEPHALEXIN 500 MG/1
500 CAPSULE ORAL 2 TIMES DAILY
Qty: 14 CAPSULE | Refills: 0 | Status: SHIPPED | OUTPATIENT
Start: 2023-08-28 | End: 2023-09-04

## 2023-08-28 RX ORDER — VANCOMYCIN HYDROCHLORIDE 125 MG/1
125 CAPSULE ORAL 2 TIMES DAILY
Qty: 28 CAPSULE | Refills: 0 | Status: SHIPPED | OUTPATIENT
Start: 2023-08-28

## 2023-08-28 RX ORDER — LOPERAMIDE HCL 2 MG
2 CAPSULE ORAL 4 TIMES DAILY PRN
Qty: 10 CAPSULE | Refills: 0 | Status: SHIPPED | OUTPATIENT
Start: 2023-08-28

## 2023-08-28 RX ORDER — ACETAMINOPHEN 325 MG/1
650 TABLET ORAL EVERY 6 HOURS PRN
Start: 2023-09-05

## 2023-08-28 RX ADMIN — FLUOXETINE 30 MG: 20 CAPSULE ORAL at 08:29

## 2023-08-28 RX ADMIN — CEFAZOLIN SODIUM 2 G: 2 INJECTION, SOLUTION INTRAVENOUS at 00:12

## 2023-08-28 RX ADMIN — VANCOMYCIN HYDROCHLORIDE 125 MG: 125 CAPSULE ORAL at 08:29

## 2023-08-28 RX ADMIN — FLUTICASONE PROPIONATE 1 SPRAY: 50 SPRAY, METERED NASAL at 08:36

## 2023-08-28 RX ADMIN — ACETAMINOPHEN 975 MG: 325 TABLET, FILM COATED ORAL at 13:38

## 2023-08-28 RX ADMIN — MICONAZOLE NITRATE: 20 CREAM TOPICAL at 08:37

## 2023-08-28 RX ADMIN — Medication 1 CAPSULE: at 08:29

## 2023-08-28 RX ADMIN — VANCOMYCIN HYDROCHLORIDE 125 MG: 125 CAPSULE ORAL at 12:52

## 2023-08-28 RX ADMIN — ACETAMINOPHEN 975 MG: 325 TABLET, FILM COATED ORAL at 05:04

## 2023-08-28 RX ADMIN — CEFAZOLIN SODIUM 2 G: 2 INJECTION, SOLUTION INTRAVENOUS at 08:29

## 2023-08-28 ASSESSMENT — ACTIVITIES OF DAILY LIVING (ADL)
ADLS_ACUITY_SCORE: 49
ADLS_ACUITY_SCORE: 45
ADLS_ACUITY_SCORE: 49
ADLS_ACUITY_SCORE: 45
ADLS_ACUITY_SCORE: 49
ADLS_ACUITY_SCORE: 45

## 2023-08-28 NOTE — CONSULTS
"[facility] Nurse Inpatient Assessment     Consulted for: abdomen     Summary: 1) Chronic abdominal wound with extensive history. POC is slightly modified from wound care at patient's living facility.    Patient History (according to provider note(s):      \"Estephania Castillo is a 68 year old female admitted on 8/25/2023. She has a PMHx including HTN, O2 dependent COPD, DM2, C.diff colonization (PCR+, antigen+, toxin- on 3/28/23), Pulmonary hypertension, Dyslipidemia, Intraventricular hemorrhage from AVM, nonhealing abdominal wound, chronic hernia, depression who presents to the ED for worsening rash across her chest.\"    Assessment:      Areas visualized during today's visit: Abdomen    Wound location: Midline upper abdomen        Last photo: 8/28/23  Wound due to: Surgical Wound  Wound history/plan of care: Patient had a PEG tube that dislodged and unfortunately tube feeding formula leaked into subcutaneous space. Patient had surgery to treat the subcutaneous fluid collections resulting in a chronic wound that had almost healed by September of 2022. This wound reopened. Has been receiving wound care through her facility.  Wound base:   moist, pink tissue with adherent pale fibrinous tissue at wound margins     Palpation of the wound bed: normal      Drainage: moderate     Description of drainage: serosanguinous, tan, and mucousy     Measurements (length x width x depth, in cm): 7  x 7.5  x  0.1 cm      Tunneling: N/A     Undermining: N/A  Periwound skin: Scar tissue      Color: pink      Temperature: normal   Odor: mild  Pain: denies , none  Pain interventions prior to dressing change: no significant pain present   Treatment goal: Drainage control, Infection control/prevention, Increase granulation, and Protection  STATUS: initial assessment  Supplies ordered: gathered, supplies stored on unit, and discussed with patient       Treatment Plan:     Midline abdominal wound(s): Every other " "day and PRN for drainage/soiling  Cleanse wound and enrrique-wound skin with wound cleanser.  Swab enrrique-wound skin with no-sting barrier and allow to dry completely.  Cut a piece of Hydrofera blue to fit in wound bed. Moisten the Hydrofera with saline. Squeeze out excess moisture and place dressing in wound bed.   Cover with foam and silicon dressing. Initial and date.    Orders: Written    RECOMMEND PRIMARY TEAM ORDER: None, at this time  Education provided: plan of care  Discussed plan of care with: Patient  WO nurse follow-up plan: weekly  Notify WOC if wound(s) deteriorate.  Nursing to notify the Provider(s) and re-consult the WOC Nurse if new skin concern.    DATA:     Current support surface: Standard  Standard gel/foam mattress (IsoFlex, Atmos air, etc)  Containment of urine/stool:  Not assessed today  BMI: Body mass index is 29.28 kg/m .   Active diet order: Orders Placed This Encounter      Regular Diet Adult     Output: I/O last 3 completed shifts:  In: 480 [P.O.:480]  Out: -      Labs:   Recent Labs   Lab 08/26/23  0925   HGB 10.5*   WBC 9.8   A1C 5.8*     Pressure injury risk assessment:   Sensory Perception: 4-->no impairment  Moisture: 3-->occasionally moist  Activity: 1-->bedfast  Mobility: 2-->very limited  Nutrition: 3-->adequate  Friction and Shear: 2-->potential problem  Eddi Score: 15    Sonya Fay RN, CWON  Please contact via [employer] at name or group \"[facility] nurse\"- M-F 8A-4P  Leave  @ [phone] for non-urgent needs. Checked occasionally M-F.   "

## 2023-08-28 NOTE — DISCHARGE INSTRUCTIONS
Wound Care  Midline abdominal wound(s): Every other day and PRN for drainage/soiling  Cleanse wound and enrrique-wound skin with wound cleanser.  Swab enrrique-wound skin with no-sting barrier and allow to dry completely.  Cut a piece of Hydrofera blue to fit in wound bed. Moisten the Hydrofera with saline. Squeeze out excess moisture and place dressing in wound bed.   Cover with foam and silicon dressing. Initial and date.

## 2023-08-28 NOTE — PROGRESS NOTES
Patient is a&o x3. Vss on RA. Assist of 2 with lift. Wheelchair bound at baseline. Incontinent of B&B x2. Abdominal wound dressing changed by wound nurse. Iv access removed. Discharging back to  faculty today

## 2023-08-28 NOTE — PROGRESS NOTES
Pt discharged around 1650 to [facility]/ via medical transport.  Medications, AVS printed and all belongings were given to patient.

## 2023-08-28 NOTE — PROGRESS NOTES
Care Management Discharge Note    Discharge Date: 08/28/2023       Discharge Disposition: Assisted Living, Home Care    Discharge Services:      Discharge DME:      Discharge Transportation: health plan transportation    Private pay costs discussed: transportation costs    Does the patient's insurance plan have a 3 day qualifying hospital stay waiver?  No    PAS Confirmation Code:    Patient/family educated on Medicare website which has current facility and service quality ratings:      Education Provided on the Discharge Plan:    Persons Notified of Discharge Plans: Pt/Guardian Mabel/MD/[facility] staff  Patient/Family in Agreement with the Plan: yes    Handoff Referral Completed: No    Additional Information:  Received call back from Pt's guardian Mabel.  She is okay with her discharge back to .  She had talked with nurse Jessica and passed along following number: [phone].     Received call back from [facility]- Pt is open to them for Home RN, can resume at discharge.  Orders for resumption added and faxed via [email or web address].     CC called Jessica and she stated Pt could return today.  Meds filled here and sent with is okay.   Orders faxed to [phone]     Health Stretcher (due to wound/stroke hx) set up for [phone] via [employer].  PCS filled out and on chart/faxed.     Guardian, MD and  notified of ride time.   Guardian would like orders faxed to [phone] (done via [employer])    Bedside RN/charge updated.  HUC to make packet  Bedside RN to review AVS.   Renetta Huerta RN

## 2023-08-28 NOTE — DISCHARGE SUMMARY
Discharge Summary  Hospitalist    Date of Admission:  8/25/2023  Date of Discharge:  8/28/2023  Discharging Provider: Rimma Au MD, MD  Date of Service (when I saw the patient): 08/28/23    Discharge Diagnoses   Chest wall cellulitis  Intertrigo     History of Present Illness   Please refer H & P for details.      Hospital Course   Estephania Castillo is a 68 year old female admitted on 8/25/2023. She has a PMHx including HTN, O2 dependent COPD, DM2, C.diff colonization (PCR+, antigen+, toxin- on 3/28/23), Pulmonary hypertension, Dyslipidemia, Intraventricular hemorrhage from AVM, nonhealing abdominal wound, chronic hernia, depression who presents to the ED for worsening rash across her chest.      Chest wall cellulitis  Intertrigo   Patient presented with spreading rash in the front of her chest on her breasts over the last 5 to 7 days.  Appears that she was started on clindamycin but rash continued to spread despite antibiotic.  Reassuring hemodynamics, lactic acid 2.0, WBC 9.7.  Does not meet sepsis criteria.  Blood cultures collected in the emergency department.  -Admit as inpatient  -Continue on cefazolin . Increase to 2 g IV every 8 hours.  Blood cultures with no growth.  -, procalcitonin 0.19  -Miconazole cream for intertriginous areas under breasts  -8/27: Erythema on chest improving, receding from margins  -8/28: Erythema significantly improved.  Switch to oral Keflex x7 days.  We will continue on probiotic and oral vancomycin for C. difficile prophylaxis while on antibiotics and for 1 week after.  Follow-up with PCP.    Recurrent acute on chronic diarrhea  C.diff colitis in April  Appears to have C. difficile colitis in April.  She has stool incontinence at baseline and is somewhat hard to discern if current loose stools is significantly different compared to her baseline.  No abdominal pain or other symptoms strongly concerning for recurrent C. difficile colitis at this time.  Certainly if  diarrhea continues to be significant or worsen, will need to test for C. difficile.  -At this time we will continue on vancomycin p.o. twice daily for C. difficile prophylaxis while on antibiotic and for 1 week after completion.     Chronic nonhealing abdominal wound  Follows with infectious disease. In May 2021 she was admitted to [facility] for a hemorhagic stroke .She had a PEG tube placed. During this stay the PEG was dislodged, resulting in tube feeds entering the subcutaneous space, and cellulitis. She had an ex lap and washout on 6/5/21. The PEG was removed. There was no significant intra-abdominal infection seen. There was a significant collection of tube feeds in the subcutaneous space under the cellulitis. Several incisions were made down to the fascia and tunnels were made to unify the space. She needed another washout on 6/9/21. Notes report that she developed a chronic wound in the area and the wound never really healed. She also developed a large ventral incisional hernia. She saw General surgery for this twice in 2023, and they decided to hold off on surgical repair of the hernia because it would be a big surgery and potentially set back her stroke recovery. The wound had almost healed by September 2022, when it looked like a scab. She developed increasing purulent drainage from the wound in November 2022 and the wound started to open up at a superficial level.   -Wound RN consulted (patient has specific dressing instructions at her care facility)     Chronic hypoxic respiratory failure  Oxygen dependent COPD (2L)  Pulmonary hypertension  Patient denies any acute respiratory complaints.  Appears to have chronic cough.  -continue chronic O2 therapy at bedtime and PTA inhalers  -Ordered as needed nebs per patient request     Hypertension, controlled  -Noted     DM2, diet controlled  Most recent hemoglobin A1c 8/2022 to 6.0%.  Recheck A1c 5.8.  -4 times daily glucose monitoring with medium dose sliding scale  insulin.  Has not needed any insulin here.     Dyslipidemia: resume statin upon discharge     H/o Intraventricular hemorrhage from AVM: In [place] 2021 [place] [facility] [place] a [place] requiring an ICU stay, ventriculostomy tubes, and craniotomy with C1 laminectomy and partial cerebellar tonsillar resection on 5/15/21.  She undergoes rehab and still has residual speech difficulty and weakness of the lower extremities.   -back to group home with outpatient therapy on discharge.  Family expressing a lot of concern surrounding cares at group home/shelter.  Discussed again with family on day of discharge.  On board with patient returning to shelter/group home.  Will resume home cares at discharge.  *Sister is guardian, Mabel     Depression  -Continue PTA fluoxetine         Rimma Au MD, MD      Pending Results   These results will be followed up by Hospitalist team.  Unresulted Labs Ordered in the Past 30 Days of this Admission       Date and Time Order Name Status Description    8/25/2023  3:25 PM Blood Culture Wrist, Left Preliminary     8/25/2023  3:25 PM Blood Culture Arm, Right Preliminary             Code Status   Full Code       Primary Care Physician   ORALIA SANDOVAL    Follow-ups Needed After Discharge   Follow-up Appointments     Follow-up and recommended labs and tests       Follow up with primary care provider, ORALIA SANDOVAL, within 7 days for   hospital follow- up.  No follow up labs or test are needed.            Physical Exam   Temp: 98.2  F (36.8  C) Temp src: Oral BP: 93/56 Pulse: 64   Resp: 16 SpO2: 96 % O2 Device: None (Room air)    Vitals:    08/25/23 2141 08/27/23 0624   Weight: 85.9 kg (189 lb 6 oz) 84.8 kg (186 lb 15.2 oz)     Vital Signs with Ranges  Temp:  [98.2  F (36.8  C)-98.8  F (37.1  C)] 98.2  F (36.8  C)  Pulse:  [64-79] 64  Resp:  [16-18] 16  BP: ()/(54-60) 93/56  SpO2:  [93 %-96 %] 96 %  No intake/output data recorded.      Constitutional: Alert, appears comfortable, in  no acute distress  Respiratory: Non labored breathing, clear anteriorly, occasional cough  Cardiovascular: Heart sounds regular rate and rhythm, no murmurs, no leg edema  GI: Abdomen is soft, non distended, non tender.  Wound in anterior abdominal wall that was dressed  Skin/Integumen: Erythematous area across anterior chest appears to be improving, getting faint, receding from margins  Neuro: alert, converses appropriately, moving all extremities, fluent speech, no facial asymmetry  Psych: mood and affect appropriate                Discharge Disposition   Discharged to group home  Condition at discharge: Stable    Consultations This Hospital Stay   VASCULAR ACCESS ADULT IP CONSULT  WOUND OSTOMY CONTINENCE NURSE  IP CONSULT  VASCULAR ACCESS ADULT IP CONSULT  CARE MANAGEMENT / SOCIAL WORK IP CONSULT    Time Spent on this Encounter   IRimma MD, personally saw the patient today and spent greater than 30 minutes discharging this patient.    Discharge Orders      Reason for your hospital stay    Cellulitis, chest     Follow-up and recommended labs and tests     Follow up with primary care provider, ORALIA SANDOVAL, within 7 days for hospital follow- up.  No follow up labs or test are needed.     Activity    Your activity upon discharge: activity as tolerated     When to contact your care team    Call your primary doctor if you have any of the following: worsening redness, pain, swelling, fever, shortness of breath     Resume Home Care Services    Resume Home RN.   Previous Dressing changes and orders.     Diet    Follow this diet upon discharge: Orders Placed This Encounter      Regular Diet Adult     Discharge Medications   Current Discharge Medication List        START taking these medications    Details   cephALEXin (KEFLEX) 500 MG capsule Take 1 capsule (500 mg) by mouth 2 times daily for 7 days  Qty: 14 capsule, Refills: 0    Associated Diagnoses: Cellulitis of chest wall      loperamide (IMODIUM) 2 MG  capsule Take 1 capsule (2 mg) by mouth 4 times daily as needed for diarrhea  Qty: 10 capsule, Refills: 0    Associated Diagnoses: Cellulitis of chest wall           CONTINUE these medications which have CHANGED    Details   !! acetaminophen (TYLENOL) 325 MG tablet Take 2 tablets (650 mg) by mouth every 6 hours as needed for fever or pain    Associated Diagnoses: Cellulitis of chest wall      !! acetaminophen (TYLENOL) 500 MG tablet Take 2 tablets (1,000 mg) by mouth every 8 hours for 7 days  Qty: 42 tablet, Refills: 0    Associated Diagnoses: Cellulitis of chest wall      nystatin (MYCOSTATIN) 801937 UNIT/GM external powder Apply topically daily as needed Apply to under breasts, yeasty skin folds    Associated Diagnoses: Cellulitis of chest wall      vancomycin (VANCOCIN) 125 MG capsule Take 1 capsule (125 mg) by mouth 2 times daily  Qty: 28 capsule, Refills: 0    Associated Diagnoses: Cellulitis of chest wall       !! - Potential duplicate medications found. Please discuss with provider.        CONTINUE these medications which have NOT CHANGED    Details   albuterol (PROAIR HFA/PROVENTIL HFA/VENTOLIN HFA) 108 (90 Base) MCG/ACT inhaler Inhale 1 puff into the lungs every 6 hours as needed for shortness of breath      ammonium lactate (LAC-HYDRIN) 12 % external lotion Apply topically daily Apply to legs      Ascorbic Acid 500 MG CHEW Take 500 mg by mouth daily      atorvastatin (LIPITOR) 40 MG tablet Take 40 mg by mouth daily      Dextromethorphan-guaiFENesin (MUCINEX DM MAXIMUM STRENGTH)  MG TB12 Take 1 tablet by mouth 2 times daily as needed      Dimethicone 5 % CREA Apply topically daily as needed Apply to bottom for skin irritation or reddened skin      FLUoxetine (PROZAC) 10 MG capsule Take 10 mg by mouth every morning Give with 20mg to equal 30mg      FLUoxetine 20 MG tablet Take 20 mg by mouth every morning Give with 10mg to equal 30mg      fluticasone (FLONASE) 50 MCG/ACT nasal spray Spray 1 spray into  both nostrils every morning      fluticasone-salmeterol (WIXELA INHUB) 500-50 MCG/ACT inhaler Inhale 1 puff into the lungs every 12 hours      hydrOXYzine (ATARAX) 50 MG tablet Take 50 mg by mouth every 6 hours as needed for anxiety      lactobacillus rhamnosus, GG, (CULTURELL) capsule Take 1 capsule by mouth 2 times daily      menthol-zinc oxide (CALMOSEPTINE) 0.44-20.6 % OINT ointment Apply topically 2 times daily as needed for skin protection      Multiple Vitamin (ONE-A-DAY ESSENTIAL) TABS Take 1 tablet by mouth daily      polyvinyl alcohol (LIQUIFILM TEARS) 1.4 % ophthalmic solution Place 1 drop Into the left eye 4 times daily At 8am, 1pm, 5pm, and 8pm      potassium chloride (KLOR-CON) 20 MEQ packet Take 20 mEq by mouth 2 times daily (with meals) Mix with liquid and take at 8am and 5pm      Psyllium 33 % POWD Take 1 packet by mouth 2 times daily (with meals) Mix 3.4grams/1 packet with liquid and take at 8am and 5pm      sodium chloride (OCEAN) 0.65 % nasal spray Spray 1 spray into both nostrils daily as needed for congestion      Vitamin D3 (CHOLECALCIFEROL) 125 MCG (5000 UT) tablet Take 1 tablet by mouth daily           STOP taking these medications       clindamycin (CLEOCIN) 300 MG capsule Comments:   Reason for Stopping:             Allergies   Allergies   Allergen Reactions    Bupropion Anaphylaxis    Sulfa Antibiotics      Data   Most Recent 3 CBC's:  Recent Labs   Lab Test 08/26/23  0925 08/25/23  1616 04/06/23  0612   WBC 9.8 9.7 11.5*   HGB 10.5* 11.9 10.7*   MCV 87 89 86    254 310      Most Recent 3 BMP's:  Recent Labs   Lab Test 08/28/23  1252 08/28/23  0818 08/28/23  0222 08/26/23  1156 08/26/23  0925 08/25/23  2147 08/25/23  1616 04/06/23  0612   NA  --   --   --   --  140  --  137 140   POTASSIUM  --   --   --   --  3.7  --  3.5 3.2*   CHLORIDE  --   --   --   --  106  --  103 111*   CO2  --   --   --   --  24  --  23 23   BUN  --   --   --   --  10.4  --  9.7 6.1*   CR  --   --   --   --   0.68  --  0.66 0.59   ANIONGAP  --   --   --   --  10  --  11 6*   EDILIA  --   --   --   --  8.7*  --  8.5* 7.3*   GLC 91 96 110*   < > 109*   < > 161* 74    < > = values in this interval not displayed.     Most Recent 2 LFT's:No lab results found.  Most Recent INR's and Anticoagulation Dosing History:  Anticoagulation Dose History           No data to display              Most Recent 3 Troponin's:No lab results found.  Most Recent Cholesterol Panel:No lab results found.  Most Recent 6 Bacteria Isolates From Any Culture (See EPIC Reports for Culture Details):No lab results found.  Most Recent TSH, T4 and A1c Labs:  Recent Labs   Lab Test 08/26/23  0925   A1C 5.8*       No results found for this or any previous visit.

## 2023-08-28 NOTE — PLAN OF CARE
Goal Outcome Evaluation:        Diagnosis: Cellulitis of the chest wall  Mental Status:A &O x4  Activity/dangle: Turn/ reposition  Diet: Regular  Pain: Controlled  [place]/Voiding:incontinent  Tele/Restraints/Iso:NA  02/LDA: O2 at 5 L  D/C Date: To be determine  Other Info: Pt has rash on chest, area is marked to determine extension, assist x 2 with lift, and reposition q2hrs. Had 2 episodes of loose stool. Pt prefer female care givers. IV SL, due meds served as ordered and management continue.

## 2023-08-28 NOTE — CONSULTS
Care Management Initial Consult    General Information  Assessment completed with: Patient, VM-chart review,    Type of CM/SW Visit: Offer D/C Planning    Primary Care Provider verified and updated as needed: Yes   Readmission within the last 30 days:        Reason for Consult: discharge planning  Advance Care Planning:            Communication Assessment  Patient's communication style: spoken language (English or Bilingual)    Hearing Difficulty or Deaf: no   Wear Glasses or Blind: yes    Cognitive  Cognitive/Neuro/Behavioral: WDL        Orientation: oriented x 4  Mood/Behavior: calm          Living Environment:   People in home: facility resident     Current living Arrangements: assisted living  Name of Facility: [facility]   Able to return to prior arrangements: yes       Family/Social Support:  Care provided by: homecare agency, other (see comments)  Provides care for: no one, unable/limited ability to care for self  Marital Status: Single             Description of Support System:           Current Resources:   Patient receiving home care services: Yes  Skilled Home Care Services: Skilled Nursing  Community Resources:    Equipment currently used at home:    Supplies currently used at home: Wound Care Supplies    Employment/Financial:  Employment Status:          Financial Concerns:             Does the patient's insurance plan have a 3 day qualifying hospital stay waiver?  No    Lifestyle & Psychosocial Needs:  Social Determinants of Health     Tobacco Use: Not on file   Alcohol Use: Not on file   Financial Resource Strain: Not on file   Food Insecurity: Not on file   Transportation Needs: Not on file   Physical Activity: Not on file   Stress: Not on file   Social Connections: Not on file   Intimate Partner Violence: Not on file   Depression: Not on file   Housing Stability: Not on file       Functional Status:  Prior to admission patient needed assistance:              Mental Health Status:       "    Chemical Dependency Status:                Values/Beliefs:  Spiritual, Cultural Beliefs, Restoration Practices, Values that affect care:                 Additional Information:  Per MD Pt is ready to discharge today back to [facility]/    Pt lives at [facility], [address].   CC called [phone] option 4 for house.  Left message with both Floor nurse and DON to discuss Pt's return.  Also tried calling Susan (all routed through above number) and no answer.    Per patient she lives at house 3 where they \"do everything for me.\"  Pt notes she has rose lift, hospital bed, w/c, shower chair, home o2 (does not use all the time).  She receives nurse visits for wound care from [facility] 3x week.  Pt recently started OP PT with eleanor Mantilla.  Pt is okay with return to [facility].  Needs ride arranged.  Feels she needs stretcher due to not being able to tolerate w/c.     CC called and left message with Guardian Mabel to discuss discharge plan.   CC called and left message with Dominic weir to discuss discharge.     CC to follow for discharge plan and arrange transport at discharge.     Renetta Huerta RN     "

## 2023-08-30 LAB
BACTERIA BLD CULT: NO GROWTH
BACTERIA BLD CULT: NO GROWTH

## 2023-11-04 ENCOUNTER — HEALTH MAINTENANCE LETTER (OUTPATIENT)
Age: 68
End: 2023-11-04

## 2024-01-13 ENCOUNTER — HEALTH MAINTENANCE LETTER (OUTPATIENT)
Age: 69
End: 2024-01-13

## 2024-06-01 ENCOUNTER — HEALTH MAINTENANCE LETTER (OUTPATIENT)
Age: 69
End: 2024-06-01

## 2024-10-19 ENCOUNTER — HEALTH MAINTENANCE LETTER (OUTPATIENT)
Age: 69
End: 2024-10-19

## 2024-12-22 ENCOUNTER — HEALTH MAINTENANCE LETTER (OUTPATIENT)
Age: 69
End: 2024-12-22

## 2025-01-25 ENCOUNTER — HEALTH MAINTENANCE LETTER (OUTPATIENT)
Age: 70
End: 2025-01-25

## 2025-04-10 ENCOUNTER — LAB REQUISITION (OUTPATIENT)
Dept: LAB | Facility: CLINIC | Age: 70
End: 2025-04-10
Payer: COMMERCIAL

## 2025-04-10 DIAGNOSIS — E78.5 HYPERLIPIDEMIA, UNSPECIFIED: ICD-10-CM

## 2025-04-10 DIAGNOSIS — I10 ESSENTIAL (PRIMARY) HYPERTENSION: ICD-10-CM

## 2025-04-10 DIAGNOSIS — R19.7 DIARRHEA, UNSPECIFIED: ICD-10-CM

## 2025-04-10 DIAGNOSIS — Z00.00 ENCOUNTER FOR GENERAL ADULT MEDICAL EXAMINATION WITHOUT ABNORMAL FINDINGS: ICD-10-CM

## 2025-04-10 LAB
ALBUMIN SERPL BCG-MCNC: 3.1 G/DL (ref 3.5–5.2)
ALP SERPL-CCNC: 93 U/L (ref 40–150)
ALT SERPL W P-5'-P-CCNC: 12 U/L (ref 0–50)
ANION GAP SERPL CALCULATED.3IONS-SCNC: 9 MMOL/L (ref 7–15)
AST SERPL W P-5'-P-CCNC: 13 U/L (ref 0–45)
BILIRUB SERPL-MCNC: 0.2 MG/DL
BUN SERPL-MCNC: 11.8 MG/DL (ref 8–23)
CALCIUM SERPL-MCNC: 8.7 MG/DL (ref 8.8–10.4)
CHLORIDE SERPL-SCNC: 99 MMOL/L (ref 98–107)
CHOLEST SERPL-MCNC: 129 MG/DL
CREAT SERPL-MCNC: 0.78 MG/DL (ref 0.51–0.95)
EGFRCR SERPLBLD CKD-EPI 2021: 83 ML/MIN/1.73M2
ERYTHROCYTE [DISTWIDTH] IN BLOOD BY AUTOMATED COUNT: 14 % (ref 10–15)
EST. AVERAGE GLUCOSE BLD GHB EST-MCNC: 163 MG/DL
FASTING STATUS PATIENT QL REPORTED: NO
GLUCOSE SERPL-MCNC: 191 MG/DL (ref 70–99)
HBA1C MFR BLD: 7.3 %
HCO3 SERPL-SCNC: 36 MMOL/L (ref 22–29)
HCT VFR BLD AUTO: 36 % (ref 35–47)
HDLC SERPL-MCNC: 33 MG/DL
HGB BLD-MCNC: 11.3 G/DL (ref 11.7–15.7)
HOLD SPECIMEN: NORMAL
LDLC SERPL CALC-MCNC: 53 MG/DL
MCH RBC QN AUTO: 28.3 PG (ref 26.5–33)
MCHC RBC AUTO-ENTMCNC: 31.4 G/DL (ref 31.5–36.5)
MCV RBC AUTO: 90 FL (ref 78–100)
NONHDLC SERPL-MCNC: 96 MG/DL
PLATELET # BLD AUTO: 223 10E3/UL (ref 150–450)
POTASSIUM SERPL-SCNC: 2.5 MMOL/L (ref 3.4–5.3)
PROT SERPL-MCNC: 6 G/DL (ref 6.4–8.3)
RBC # BLD AUTO: 3.99 10E6/UL (ref 3.8–5.2)
SODIUM SERPL-SCNC: 144 MMOL/L (ref 135–145)
TRIGL SERPL-MCNC: 216 MG/DL
TSH SERPL DL<=0.005 MIU/L-ACNC: 2.75 UIU/ML (ref 0.3–4.2)
VIT B12 SERPL-MCNC: 381 PG/ML (ref 232–1245)
VIT D+METAB SERPL-MCNC: 63 NG/ML (ref 20–50)
WBC # BLD AUTO: 5.9 10E3/UL (ref 4–11)

## 2025-04-10 PROCEDURE — 83036 HEMOGLOBIN GLYCOSYLATED A1C: CPT | Mod: ORL | Performed by: PHYSICIAN ASSISTANT

## 2025-04-10 PROCEDURE — 85027 COMPLETE CBC AUTOMATED: CPT | Mod: ORL | Performed by: PHYSICIAN ASSISTANT

## 2025-04-10 PROCEDURE — 84443 ASSAY THYROID STIM HORMONE: CPT | Mod: ORL | Performed by: PHYSICIAN ASSISTANT

## 2025-04-10 PROCEDURE — 80061 LIPID PANEL: CPT | Mod: ORL | Performed by: PHYSICIAN ASSISTANT

## 2025-04-10 PROCEDURE — 80053 COMPREHEN METABOLIC PANEL: CPT | Mod: ORL | Performed by: PHYSICIAN ASSISTANT

## 2025-04-10 PROCEDURE — 82306 VITAMIN D 25 HYDROXY: CPT | Mod: ORL | Performed by: PHYSICIAN ASSISTANT

## 2025-04-10 PROCEDURE — 82607 VITAMIN B-12: CPT | Mod: ORL | Performed by: PHYSICIAN ASSISTANT

## 2025-04-14 ENCOUNTER — APPOINTMENT (OUTPATIENT)
Dept: GENERAL RADIOLOGY | Facility: CLINIC | Age: 70
End: 2025-04-14
Attending: EMERGENCY MEDICINE
Payer: COMMERCIAL

## 2025-04-14 ENCOUNTER — HOSPITAL ENCOUNTER (OUTPATIENT)
Facility: CLINIC | Age: 70
Setting detail: OBSERVATION
Discharge: NURSING, REHAB OR LONG-TERM CARE | End: 2025-04-16
Attending: EMERGENCY MEDICINE | Admitting: STUDENT IN AN ORGANIZED HEALTH CARE EDUCATION/TRAINING PROGRAM
Payer: COMMERCIAL

## 2025-04-14 DIAGNOSIS — R07.9 CHEST PAIN, UNSPECIFIED TYPE: ICD-10-CM

## 2025-04-14 DIAGNOSIS — E87.8 ELECTROLYTE ABNORMALITY: ICD-10-CM

## 2025-04-14 DIAGNOSIS — R94.31 ACUTE ELECTROCARDIOGRAM CHANGES: ICD-10-CM

## 2025-04-14 LAB
ALBUMIN SERPL BCG-MCNC: 3.2 G/DL (ref 3.5–5.2)
ALP SERPL-CCNC: 108 U/L (ref 40–150)
ALT SERPL W P-5'-P-CCNC: 12 U/L (ref 0–50)
ANION GAP SERPL CALCULATED.3IONS-SCNC: 8 MMOL/L (ref 7–15)
AST SERPL W P-5'-P-CCNC: 13 U/L (ref 0–45)
ATRIAL RATE - MUSE: 80 BPM
BASOPHILS # BLD AUTO: 0 10E3/UL (ref 0–0.2)
BASOPHILS NFR BLD AUTO: 0 %
BILIRUB SERPL-MCNC: 0.2 MG/DL
BUN SERPL-MCNC: 9.6 MG/DL (ref 8–23)
CALCIUM SERPL-MCNC: 8.6 MG/DL (ref 8.8–10.4)
CHLORIDE SERPL-SCNC: 99 MMOL/L (ref 98–107)
CREAT SERPL-MCNC: 0.76 MG/DL (ref 0.51–0.95)
DIASTOLIC BLOOD PRESSURE - MUSE: NORMAL MMHG
EGFRCR SERPLBLD CKD-EPI 2021: 85 ML/MIN/1.73M2
EOSINOPHIL # BLD AUTO: 0.1 10E3/UL (ref 0–0.7)
EOSINOPHIL NFR BLD AUTO: 2 %
ERYTHROCYTE [DISTWIDTH] IN BLOOD BY AUTOMATED COUNT: 13.7 % (ref 10–15)
GLUCOSE BLDC GLUCOMTR-MCNC: 128 MG/DL (ref 70–99)
GLUCOSE BLDC GLUCOMTR-MCNC: 148 MG/DL (ref 70–99)
GLUCOSE SERPL-MCNC: 191 MG/DL (ref 70–99)
HCO3 SERPL-SCNC: 35 MMOL/L (ref 22–29)
HCT VFR BLD AUTO: 36.4 % (ref 35–47)
HGB BLD-MCNC: 11.6 G/DL (ref 11.7–15.7)
IMM GRANULOCYTES # BLD: 0.1 10E3/UL
IMM GRANULOCYTES NFR BLD: 1 %
INTERPRETATION ECG - MUSE: NORMAL
LYMPHOCYTES # BLD AUTO: 2 10E3/UL (ref 0.8–5.3)
LYMPHOCYTES NFR BLD AUTO: 25 %
MAGNESIUM SERPL-MCNC: 1.3 MG/DL (ref 1.7–2.3)
MCH RBC QN AUTO: 28.1 PG (ref 26.5–33)
MCHC RBC AUTO-ENTMCNC: 31.9 G/DL (ref 31.5–36.5)
MCV RBC AUTO: 88 FL (ref 78–100)
MONOCYTES # BLD AUTO: 0.4 10E3/UL (ref 0–1.3)
MONOCYTES NFR BLD AUTO: 5 %
NEUTROPHILS # BLD AUTO: 5.2 10E3/UL (ref 1.6–8.3)
NEUTROPHILS NFR BLD AUTO: 67 %
NRBC # BLD AUTO: 0 10E3/UL
NRBC BLD AUTO-RTO: 0 /100
P AXIS - MUSE: 58 DEGREES
PLATELET # BLD AUTO: 211 10E3/UL (ref 150–450)
POTASSIUM SERPL-SCNC: 2.7 MMOL/L (ref 3.4–5.3)
POTASSIUM SERPL-SCNC: 2.9 MMOL/L (ref 3.4–5.3)
PR INTERVAL - MUSE: 190 MS
PROT SERPL-MCNC: 6.1 G/DL (ref 6.4–8.3)
QRS DURATION - MUSE: 82 MS
QT - MUSE: 416 MS
QTC - MUSE: 479 MS
R AXIS - MUSE: -11 DEGREES
RBC # BLD AUTO: 4.13 10E6/UL (ref 3.8–5.2)
SODIUM SERPL-SCNC: 142 MMOL/L (ref 135–145)
SYSTOLIC BLOOD PRESSURE - MUSE: NORMAL MMHG
T AXIS - MUSE: 248 DEGREES
TROPONIN T SERPL HS-MCNC: 18 NG/L
TROPONIN T SERPL HS-MCNC: 21 NG/L
VENTRICULAR RATE- MUSE: 80 BPM
WBC # BLD AUTO: 7.8 10E3/UL (ref 4–11)

## 2025-04-14 PROCEDURE — 93005 ELECTROCARDIOGRAM TRACING: CPT

## 2025-04-14 PROCEDURE — 250N000013 HC RX MED GY IP 250 OP 250 PS 637: Performed by: EMERGENCY MEDICINE

## 2025-04-14 PROCEDURE — 84155 ASSAY OF PROTEIN SERUM: CPT | Performed by: EMERGENCY MEDICINE

## 2025-04-14 PROCEDURE — 250N000011 HC RX IP 250 OP 636: Performed by: EMERGENCY MEDICINE

## 2025-04-14 PROCEDURE — 96365 THER/PROPH/DIAG IV INF INIT: CPT

## 2025-04-14 PROCEDURE — 84132 ASSAY OF SERUM POTASSIUM: CPT | Performed by: STUDENT IN AN ORGANIZED HEALTH CARE EDUCATION/TRAINING PROGRAM

## 2025-04-14 PROCEDURE — 96367 TX/PROPH/DG ADDL SEQ IV INF: CPT

## 2025-04-14 PROCEDURE — G0378 HOSPITAL OBSERVATION PER HR: HCPCS

## 2025-04-14 PROCEDURE — 36415 COLL VENOUS BLD VENIPUNCTURE: CPT | Performed by: EMERGENCY MEDICINE

## 2025-04-14 PROCEDURE — 36415 COLL VENOUS BLD VENIPUNCTURE: CPT | Performed by: STUDENT IN AN ORGANIZED HEALTH CARE EDUCATION/TRAINING PROGRAM

## 2025-04-14 PROCEDURE — 85025 COMPLETE CBC W/AUTO DIFF WBC: CPT | Performed by: EMERGENCY MEDICINE

## 2025-04-14 PROCEDURE — 99222 1ST HOSP IP/OBS MODERATE 55: CPT | Performed by: PHYSICIAN ASSISTANT

## 2025-04-14 PROCEDURE — 250N000013 HC RX MED GY IP 250 OP 250 PS 637: Performed by: PHYSICIAN ASSISTANT

## 2025-04-14 PROCEDURE — 84484 ASSAY OF TROPONIN QUANT: CPT | Performed by: EMERGENCY MEDICINE

## 2025-04-14 PROCEDURE — 83735 ASSAY OF MAGNESIUM: CPT | Performed by: EMERGENCY MEDICINE

## 2025-04-14 PROCEDURE — 71046 X-RAY EXAM CHEST 2 VIEWS: CPT

## 2025-04-14 PROCEDURE — 82962 GLUCOSE BLOOD TEST: CPT

## 2025-04-14 PROCEDURE — 99285 EMERGENCY DEPT VISIT HI MDM: CPT | Mod: 25

## 2025-04-14 RX ORDER — ALBUTEROL SULFATE 90 UG/1
1 INHALANT RESPIRATORY (INHALATION) EVERY 6 HOURS PRN
Status: DISCONTINUED | OUTPATIENT
Start: 2025-04-14 | End: 2025-04-16 | Stop reason: HOSPADM

## 2025-04-14 RX ORDER — HYDRALAZINE HYDROCHLORIDE 10 MG/1
10 TABLET, FILM COATED ORAL EVERY 4 HOURS PRN
Status: DISCONTINUED | OUTPATIENT
Start: 2025-04-14 | End: 2025-04-16 | Stop reason: HOSPADM

## 2025-04-14 RX ORDER — POTASSIUM CHLORIDE 7.45 MG/ML
10 INJECTION INTRAVENOUS ONCE
Status: COMPLETED | OUTPATIENT
Start: 2025-04-14 | End: 2025-04-14

## 2025-04-14 RX ORDER — ACETAMINOPHEN 650 MG/1
650 SUPPOSITORY RECTAL EVERY 4 HOURS PRN
Status: DISCONTINUED | OUTPATIENT
Start: 2025-04-14 | End: 2025-04-16 | Stop reason: HOSPADM

## 2025-04-14 RX ORDER — POTASSIUM CHLORIDE 1500 MG/1
40 TABLET, EXTENDED RELEASE ORAL ONCE
Status: COMPLETED | OUTPATIENT
Start: 2025-04-14 | End: 2025-04-14

## 2025-04-14 RX ORDER — CALCIUM CARBONATE 500 MG/1
2 TABLET, CHEWABLE ORAL
COMMUNITY

## 2025-04-14 RX ORDER — HYDRALAZINE HYDROCHLORIDE 20 MG/ML
10 INJECTION INTRAMUSCULAR; INTRAVENOUS EVERY 4 HOURS PRN
Status: DISCONTINUED | OUTPATIENT
Start: 2025-04-14 | End: 2025-04-16 | Stop reason: HOSPADM

## 2025-04-14 RX ORDER — HYDROXYZINE HYDROCHLORIDE 50 MG/1
50 TABLET, FILM COATED ORAL AT BEDTIME
COMMUNITY

## 2025-04-14 RX ORDER — AMOXICILLIN 250 MG
1 CAPSULE ORAL 2 TIMES DAILY PRN
Status: DISCONTINUED | OUTPATIENT
Start: 2025-04-14 | End: 2025-04-16 | Stop reason: HOSPADM

## 2025-04-14 RX ORDER — ONDANSETRON 4 MG/1
4 TABLET, ORALLY DISINTEGRATING ORAL EVERY 6 HOURS PRN
Status: DISCONTINUED | OUTPATIENT
Start: 2025-04-14 | End: 2025-04-16 | Stop reason: HOSPADM

## 2025-04-14 RX ORDER — PRENATAL VIT 91/IRON/FOLIC/DHA 28-975-200
COMBINATION PACKAGE (EA) ORAL EVERY MORNING
COMMUNITY

## 2025-04-14 RX ORDER — CARBOXYMETHYLCELLULOSE SODIUM 10 MG/ML
1 GEL OPHTHALMIC 4 TIMES DAILY
COMMUNITY

## 2025-04-14 RX ORDER — HYDROXYZINE HYDROCHLORIDE 25 MG/1
50 TABLET, FILM COATED ORAL AT BEDTIME
Status: DISCONTINUED | OUTPATIENT
Start: 2025-04-14 | End: 2025-04-16 | Stop reason: HOSPADM

## 2025-04-14 RX ORDER — ACETAMINOPHEN 325 MG/1
650 TABLET ORAL EVERY 4 HOURS PRN
Status: DISCONTINUED | OUTPATIENT
Start: 2025-04-14 | End: 2025-04-16 | Stop reason: HOSPADM

## 2025-04-14 RX ORDER — AMOXICILLIN 250 MG
2 CAPSULE ORAL 2 TIMES DAILY PRN
Status: DISCONTINUED | OUTPATIENT
Start: 2025-04-14 | End: 2025-04-16 | Stop reason: HOSPADM

## 2025-04-14 RX ORDER — MULTIPLE VITAMINS W/ MINERALS TAB 9MG-400MCG
1 TAB ORAL DAILY
COMMUNITY

## 2025-04-14 RX ORDER — ONDANSETRON 2 MG/ML
4 INJECTION INTRAMUSCULAR; INTRAVENOUS EVERY 6 HOURS PRN
Status: DISCONTINUED | OUTPATIENT
Start: 2025-04-14 | End: 2025-04-16 | Stop reason: HOSPADM

## 2025-04-14 RX ORDER — LOPERAMIDE HYDROCHLORIDE 2 MG/1
2 CAPSULE ORAL 4 TIMES DAILY PRN
Status: DISCONTINUED | OUTPATIENT
Start: 2025-04-14 | End: 2025-04-16 | Stop reason: HOSPADM

## 2025-04-14 RX ORDER — FLUTICASONE PROPIONATE AND SALMETEROL 500; 50 UG/1; UG/1
1 POWDER RESPIRATORY (INHALATION) EVERY 12 HOURS
COMMUNITY

## 2025-04-14 RX ORDER — PROCHLORPERAZINE MALEATE 5 MG/1
5 TABLET ORAL EVERY 6 HOURS PRN
Status: DISCONTINUED | OUTPATIENT
Start: 2025-04-14 | End: 2025-04-16 | Stop reason: HOSPADM

## 2025-04-14 RX ORDER — FLUTICASONE FUROATE AND VILANTEROL 200; 25 UG/1; UG/1
1 POWDER RESPIRATORY (INHALATION) DAILY
Status: DISCONTINUED | OUTPATIENT
Start: 2025-04-15 | End: 2025-04-16 | Stop reason: HOSPADM

## 2025-04-14 RX ORDER — POTASSIUM CHLORIDE 1.5 G/1.58G
20 POWDER, FOR SOLUTION ORAL 2 TIMES DAILY WITH MEALS
Status: DISCONTINUED | OUTPATIENT
Start: 2025-04-14 | End: 2025-04-16 | Stop reason: HOSPADM

## 2025-04-14 RX ORDER — MAGNESIUM SULFATE HEPTAHYDRATE 40 MG/ML
2 INJECTION, SOLUTION INTRAVENOUS ONCE
Status: COMPLETED | OUTPATIENT
Start: 2025-04-14 | End: 2025-04-14

## 2025-04-14 RX ORDER — DEXTROSE MONOHYDRATE 25 G/50ML
25-50 INJECTION, SOLUTION INTRAVENOUS
Status: DISCONTINUED | OUTPATIENT
Start: 2025-04-14 | End: 2025-04-16 | Stop reason: HOSPADM

## 2025-04-14 RX ORDER — MUPIROCIN 20 MG/G
OINTMENT TOPICAL EVERY EVENING
COMMUNITY

## 2025-04-14 RX ORDER — ASPIRIN 81 MG/1
324 TABLET, CHEWABLE ORAL ONCE
Status: COMPLETED | OUTPATIENT
Start: 2025-04-14 | End: 2025-04-14

## 2025-04-14 RX ORDER — NICOTINE POLACRILEX 4 MG
15-30 LOZENGE BUCCAL
Status: DISCONTINUED | OUTPATIENT
Start: 2025-04-14 | End: 2025-04-16 | Stop reason: HOSPADM

## 2025-04-14 RX ORDER — HYDROXYZINE HYDROCHLORIDE 25 MG/1
50 TABLET, FILM COATED ORAL EVERY 6 HOURS PRN
Status: DISCONTINUED | OUTPATIENT
Start: 2025-04-14 | End: 2025-04-16 | Stop reason: HOSPADM

## 2025-04-14 RX ADMIN — POTASSIUM CHLORIDE 40 MEQ: 1500 TABLET, EXTENDED RELEASE ORAL at 12:38

## 2025-04-14 RX ADMIN — MAGNESIUM SULFATE HEPTAHYDRATE 2 G: 40 INJECTION, SOLUTION INTRAVENOUS at 13:47

## 2025-04-14 RX ADMIN — POTASSIUM CHLORIDE 10 MEQ: 7.46 INJECTION, SOLUTION INTRAVENOUS at 12:38

## 2025-04-14 RX ADMIN — HYDROXYZINE HYDROCHLORIDE 50 MG: 25 TABLET ORAL at 21:58

## 2025-04-14 RX ADMIN — POTASSIUM CHLORIDE 20 MEQ: 1.5 POWDER, FOR SOLUTION ORAL at 18:10

## 2025-04-14 RX ADMIN — ASPIRIN 324 MG: 81 TABLET, CHEWABLE ORAL at 14:02

## 2025-04-14 ASSESSMENT — ACTIVITIES OF DAILY LIVING (ADL)
ADLS_ACUITY_SCORE: 57
ADLS_ACUITY_SCORE: 57
ADLS_ACUITY_SCORE: 63
ADLS_ACUITY_SCORE: 59
ADLS_ACUITY_SCORE: 57
ADLS_ACUITY_SCORE: 57
ADLS_ACUITY_SCORE: 59
ADLS_ACUITY_SCORE: 63
ADLS_ACUITY_SCORE: 57
ADLS_ACUITY_SCORE: 57
ADLS_ACUITY_SCORE: 63
ADLS_ACUITY_SCORE: 57

## 2025-04-14 ASSESSMENT — COLUMBIA-SUICIDE SEVERITY RATING SCALE - C-SSRS
2. HAVE YOU ACTUALLY HAD ANY THOUGHTS OF KILLING YOURSELF IN THE PAST MONTH?: NO
1. IN THE PAST MONTH, HAVE YOU WISHED YOU WERE DEAD OR WISHED YOU COULD GO TO SLEEP AND NOT WAKE UP?: NO
6. HAVE YOU EVER DONE ANYTHING, STARTED TO DO ANYTHING, OR PREPARED TO DO ANYTHING TO END YOUR LIFE?: NO

## 2025-04-14 NOTE — ED TRIAGE NOTES
BIBA from group home for abnormal labs. K checked on 4/10 2.5     Triage Assessment (Adult)       Row Name 04/14/25 1131          Triage Assessment    Airway WDL WDL        Respiratory WDL    Respiratory WDL X;cough     Cough Frequency frequent     Cough Type congested        Cardiac WDL    Cardiac WDL X;rhythm     Pulse Rate & Regularity apical pulse irregular        Cognitive/Neuro/Behavioral WDL    Cognitive/Neuro/Behavioral WDL WDL

## 2025-04-14 NOTE — ED PROVIDER NOTES
"  Emergency Department Note      History of Present Illness     Chief Complaint   Abnormal Labs      HPI   Estephania Castillo is a 69 year old female with a history of COPD, type 2 diabetes, hypertension who presents for abnormal labs. Today the patient was called and alerted that her routine labs from 4/10 showed a potassium of 2.5 and that she should head to the ED. She endorses some diarrhea and chest discomfort for the past few weeks that occurs at anytime. It is worse at night. No shortness of breath. No history of cardiac disease. No current chest pain. No diaphoresis or lightheadedness or dizziness. No fever, chills or cough. She feels she is eating and drinking normally.    Independent Historian   None    Review of External Notes       Past Medical History     Medical History and Problem List   COPD  Depression   Type 2 diabetes   Hyperlipidemia   C. Diff   Asthma   Vertigo   Obesity   Dyslipidemia   Hypertension   Nontraumatic intraventricular intracerebral hemorrhage   SUBHA  Pulmonary hypertension     Medications   Albuterol   Lipitor   Prozac   Atarax   Culturell    Surgical History   Appendectomy   Strabismus surgery   Craniotomy     Physical Exam     Patient Vitals for the past 24 hrs:   BP Temp Temp src Pulse Resp SpO2 Height Weight   04/14/25 1130 (!) 140/92 99.1  F (37.3  C) Oral 80 28 94 % 1.702 m (5' 7\") 101.6 kg (224 lb)     Physical Exam  Constitutional: Well appearing.  HEENT: Atraumatic.   Moist mucous membranes.  Neck: Soft.  Supple.  No JVD.  Cardiac: Regular rate and rhythm.  No murmur or rub.  Respiratory: Clear to auscultation bilaterally.  No respiratory distress.  No wheezing, rhonchi, or rales.  Abdomen: Soft and nontender.  Large ventral hernia that is non tender to palpation. No guarding.  Nondistended.  Musculoskeletal: No edema.  Normal range of motion.  Neurologic: Alert and oriented x3.  Normal tone and bulk.    Skin: No rashes.  No edema.  Psych: Normal affect.  Normal " behavior.            Diagnostics     Lab Results   Labs Ordered and Resulted from Time of ED Arrival to Time of ED Departure   COMPREHENSIVE METABOLIC PANEL - Abnormal       Result Value    Sodium 142      Potassium 2.7 (*)     Carbon Dioxide (CO2) 35 (*)     Anion Gap 8      Urea Nitrogen 9.6      Creatinine 0.76      GFR Estimate 85      Calcium 8.6 (*)     Chloride 99      Glucose 191 (*)     Alkaline Phosphatase 108      AST 13      ALT 12      Protein Total 6.1 (*)     Albumin 3.2 (*)     Bilirubin Total 0.2     MAGNESIUM - Abnormal    Magnesium 1.3 (*)    CBC WITH PLATELETS AND DIFFERENTIAL - Abnormal    WBC Count 7.8      RBC Count 4.13      Hemoglobin 11.6 (*)     Hematocrit 36.4      MCV 88      MCH 28.1      MCHC 31.9      RDW 13.7      Platelet Count 211      % Neutrophils 67      % Lymphocytes 25      % Monocytes 5      % Eosinophils 2      % Basophils 0      % Immature Granulocytes 1      NRBCs per 100 WBC 0      Absolute Neutrophils 5.2      Absolute Lymphocytes 2.0      Absolute Monocytes 0.4      Absolute Eosinophils 0.1      Absolute Basophils 0.0      Absolute Immature Granulocytes 0.1      Absolute NRBCs 0.0     TROPONIN T, HIGH SENSITIVITY - Abnormal    Troponin T, High Sensitivity 21 (*)    TROPONIN T, HIGH SENSITIVITY - Abnormal    Troponin T, High Sensitivity 18 (*)        Imaging   XR Chest 2 Views   Final Result   IMPRESSION: Shallow inspiration. Otherwise negative chest.      Echocardiogram Complete    (Results Pending)       EKG   ECG results from 04/14/25   EKG 12-lead, tracing only     Value    Systolic Blood Pressure     Diastolic Blood Pressure     Ventricular Rate 80    Atrial Rate 80    OK Interval 190    QRS Duration 82        QTc 479    P Axis 58    R AXIS -11    T Axis 248    Interpretation ECG      Sinus rhythm with sinus arrhythmia  ST & T wave abnormality, consider inferior ischemia  ST & T wave abnormality, consider anterolateral ischemia  Prolonged QT  Abnormal  ECG  When compared with ECG of 05-Jun-1998 15:18,  ST and T wave changes now present        Independent Interpretation   CXR: No infiltrate or mediastinal widening.    ED Course      Medications Administered   Medications   magnesium sulfate 2 g in 50 mL sterile water intermittent infusion (has no administration in time range)   potassium chloride 10 mEq in 100 mL sterile water infusion (has no administration in time range)   potassium chloride magalis ER (KLOR-CON M20) CR tablet 40 mEq (has no administration in time range)       Procedures   Procedures     Discussion of Management   Admitting Hospitalist,      ED Course   ED Course as of 04/14/25 1234   Mon Apr 14, 2025   1230 I initially assessed the patient and obtained the above history and physical exam.      Additional Documentation  None    Medical Decision Making / Diagnosis     CMS Diagnoses: None    MIPS       None    MDM   Estephania Castillo is a 69 year old female who is afebrile and hemodynamically stable.  EKG here is concerning for nonspecific ST and T wave changes anterior laterally.  She is not actively having chest pain but states she has had some vague chest discomfort over the last few weeks.  Potassium is 2.7 and magnesium is 1.3.  EKG changes may be related to electrolyte abnormalities but unclear if this is related to cardiac etiology.  This troponin mainly elevated at 21.  She was given aspirin.  I replaced her electrolytes through the IV.  Given the EKG changes electrolyte abnormalities and chest pain, discussed plan for admission.  She is in agreement.  Discussed with hospital service accepts for admission and she is in stable addition at time of admission.    Disposition   The patient was admitted to the hospital.     Diagnosis     ICD-10-CM    1. Electrolyte abnormality  E87.8       2. Acute electrocardiogram changes  R94.31       3. Chest pain, unspecified type  R07.9            Scribe Disclosure:  I, Edwin Frias, am serving as a  scribe at 11:49 AM on 4/14/2025 to document services personally performed by Patel Momin MD based on my observations and the provider's statements to me.        Patel Momin MD  04/14/25 2111

## 2025-04-14 NOTE — PROGRESS NOTES
RECEIVING UNIT ED HANDOFF REVIEW    ED Nurse Handoff Report was reviewed by: Shayy Castillo RN on April 14, 2025 at 4:30 PM

## 2025-04-14 NOTE — PHARMACY-ADMISSION MEDICATION HISTORY
Pharmacist Admission Medication History    Admission medication history is complete. The information provided in this note is only as accurate as the sources available at the time of the update.    Information Source(s): Facility ([facility]/) medication list/MAR via N/A    Pertinent Information: none    Changes made to PTA medication list:  Added: refresh eye drops, metformin, terbinafine, mupirocin, tums  Deleted: vancomycin capsules, liquifilm tears, culturelle  Changed: hydroxyzine    Allergies reviewed with patient and updates made in EHR: yes    Medication History Completed By: Charley Krishna RP 4/14/2025 1:16 PM    PTA Med List   Medication Sig Last Dose/Taking    acetaminophen (TYLENOL) 325 MG tablet Take 2 tablets (650 mg) by mouth every 6 hours as needed for fever or pain Taking As Needed    albuterol (PROAIR HFA/PROVENTIL HFA/VENTOLIN HFA) 108 (90 Base) MCG/ACT inhaler Inhale 1 puff into the lungs every 6 hours as needed for shortness of breath Taking As Needed    ammonium lactate (LAC-HYDRIN) 12 % external lotion Apply topically daily Apply to legs 4/14/2025 Morning    Ascorbic Acid 500 MG CHEW Take 500 mg by mouth daily 4/14/2025 Morning    atorvastatin (LIPITOR) 40 MG tablet Take 40 mg by mouth daily 4/14/2025 Morning    calcium carbonate (TUMS) 500 MG chewable tablet Take 2 chew tab by mouth every 2 hours as needed for heartburn. Taking As Needed    carboxymethylcellulose PF (REFRESH LIQUIGEL) 1 % ophthalmic gel Place 1 drop into both eyes 4 times daily. 4/14/2025 Morning    Dextromethorphan-guaiFENesin (MUCINEX DM MAXIMUM STRENGTH)  MG TB12 Take 1 tablet by mouth 2 times daily as needed 4/14/2025 Morning    Dimethicone 5 % CREA Apply topically daily as needed Apply to bottom for skin irritation or reddened skin Taking As Needed    FLUoxetine (PROZAC) 10 MG capsule Take 10 mg by mouth every morning Give with 20mg to equal 30mg 4/14/2025 Morning    FLUoxetine 20 MG tablet Take 20 mg by mouth every  morning Give with 10mg to equal 30mg 4/14/2025 Morning    fluticasone (FLONASE) 50 MCG/ACT nasal spray Spray 1 spray into both nostrils every morning 4/14/2025 Morning    fluticasone-salmeterol (ADVAIR) 500-50 MCG/ACT inhaler Inhale 1 puff into the lungs every 12 hours. 4/14/2025 Morning    hydrOXYzine (ATARAX) 50 MG tablet Take 50 mg by mouth every 6 hours as needed for anxiety Taking As Needed    hydrOXYzine HCl (ATARAX) 50 MG tablet Take 50 mg by mouth at bedtime. 4/13/2025 Bedtime    loperamide (IMODIUM) 2 MG capsule Take 1 capsule (2 mg) by mouth 4 times daily as needed for diarrhea Taking As Needed    menthol-zinc oxide (CALMOSEPTINE) 0.44-20.6 % OINT ointment Apply topically 2 times daily as needed for skin protection Taking As Needed    metFORMIN (GLUCOPHAGE) 500 MG tablet Take 2,000 mg by mouth daily (with breakfast). 4/14/2025 Morning    multivitamin w/minerals (THERA-VIT-M) tablet Take 1 tablet by mouth daily. 4/14/2025 Morning    mupirocin (BACTROBAN) 2 % external ointment Apply topically every evening. Under breasts 4/13/2025 Evening    nystatin (MYCOSTATIN) 548931 UNIT/GM external powder Apply topically daily as needed Apply to under breasts, yeasty skin folds Taking As Needed    potassium chloride (KLOR-CON) 20 MEQ packet Take 20 mEq by mouth 2 times daily (with meals) Mix with liquid and take at 8am and 5pm 4/14/2025 Morning    Psyllium 33 % POWD Take 1 packet by mouth 2 times daily (with meals) Mix 3.4grams/1 packet with liquid and take at 8am and 5pm Past Week    sodium chloride (OCEAN) 0.65 % nasal spray Spray 1 spray into both nostrils daily as needed for congestion Taking As Needed    terbinafine (LAMISIL) 1 % external cream Apply topically every morning. Under breasts Morning    Vitamin D3 (CHOLECALCIFEROL) 125 MCG (5000 UT) tablet Take 1 tablet by mouth daily 4/14/2025 Morning

## 2025-04-14 NOTE — H&P
[facility]  History and Physical - Hospitalist Service       Date of Admission:  4/14/2025  PRIMARY CARE PROVIDER:    Shannan Rubio    Assessment & Plan   Estephaniamargo Castillo is a 69 year old female with PMH of COPD, depression, DM II, HLD, asthma, vertigo, obesity, HTN, SUBHA, pulmonary hypertension admitted on 4/14/25 with chest pain, hypokalemia, hypomagnesemia.    Hypokalemia  Hypomagnesemia  Chronic diarrhea  *K 2.7, mag 1.3 on admission. She reports no change in her chronically loose stools No fevers, chills, abdominal pain, nausea, vomiting.  *Patient has chronic diarrhea at baseline. Has previously been seen by Adam SHAIKH, declined colonoscopy for further workup. She was started on Psyllium husk. She also has C. Diff colonization.  - Continue PTA potassium chloride 20 mEq BID  - RN replacement protocol  - Continuous telemetry    Chest pain  EKG changes  *Patient reports she had an episode of very sharp left sided chest discomfort last week which she attributed to gas, resolved with Tums. She has since been having very mild left sided discomfort only in the evening, not sharp. Lasts a few minutes at a time. No associated dyspnea or nausea. She has no cardiac history but is a former smoker and reportedly her father had an MI in his mid 40s.  *Troponin 21. EKG SR with sinus arrhythmia, non specific ST-T changes in inferior and anterolateral leads,  ms. Note she also has low mag and K.  - Repeat troponin  - Repeat EKG when electrolyte abnormalities have resolved  - Echocardiogram  - Continuous telemetry    DM II, non insulin dependent  *HbA1c was 7.3 on 4/10/25  *PTA Metformin 2000 mg daily  - Hold PTA Metformin  - Accuchecks ACHS with sliding scale coverage  - Hypoglycemia protocol    Chronic abdominal wound, POA  Large ventral hernia  - WOC consult  - Outpatient general surgery follow up    COPD  Asthma  - Continue PTA Advair, Albuterol PRN    Depression  Anxiety  - Continue PTA  "Prozac, Hydroxyzine    HLD  - Can resume PTA Atorvastatin on discharge    Clinically Significant Risk Factors Present on Admission        # Hypokalemia: Lowest K = 2.7 mmol/L in last 2 days, will replace as needed    # Hypocalcemia: Lowest Ca = 8.6 mg/dL in last 2 days, will monitor and replace as appropriate   # Hypomagnesemia: Lowest Mg = 1.3 mg/dL in last 2 days, will replace as needed   # Hypoalbuminemia: Lowest albumin = 3.2 g/dL at 4/14/2025 11:47 AM, will monitor as appropriate              # DMII: A1C = 7.3 % (Ref range: <5.7 %) within past 6 months    # Obesity: Estimated body mass index is 35.08 kg/m  as calculated from the following:    Height as of this encounter: 1.702 m (5' 7\").    Weight as of this encounter: 101.6 kg (224 lb).                   Diet:  moderate consistent carb  DVT Prophylaxis: Pneumatic Compression Devices  Peralta Catheter: Not present  Lines: None     Cardiac Monitoring: None  Code Status:  full         Disposition Plan      Expected Discharge Date: 04/15/2025             Entered: Lalita Spencer PA-C 04/14/2025, 2:33 PM     Medically Ready for Discharge: Anticipated Tomorrow       The patient's care was discussed with the Attending Physician, Dr. Orozco and Patient.    Lalita Spencer PA-C  [facility]  Securely message with the Vocera Web Console (learn more here)      ______________________________________________________________________    Chief Complaint   Abnormal labs    History is obtained from the patient and EMR.      History of Present Illness   Estephania Castillo is a 69 year old female with PMH of COPD, depression, DM II, HLD, asthma, vertigo, obesity, HTN, SUBHA, pulmonary hypertension admitted on 4/14/25 with chest pain, hypokalemia, hypomagnesemia.    Patient had routine labs on 4/10/25 showing K 2.5 and was referred to the ED. She has chronic diarrhea which she reports is unchanged. No abdominal pain, nausea, vomiting, fevers, chills. " She states about a week ago she had a sharp left sided chest pain that lasted a few minutes in the evening, resolved after Tums. She has since been having more mild left sided pain in the evening. Lasts a few minutes at a time for a few hours. No associated dyspnea or nausea. She has no cardiac history. She is a former smoker. Her father reportedly had an MI in his mid 40s.    In the ED, afebrile with HR 80 and /92, saturating 94% on RA. Repeat CBC with stable hgb 11.6. Repeat CMP with K 2.7, mag 1.3. Troponin 21. EKG SR with sinus arrhythmia, QTc 479 ms, ST&T wave abnormality in inferior and anterolateral leads. CXR NAD. She was given  mg, IV magnesium 2 g, IV potassium chloride 10 mEq and PO potassium chloride 40 mEq in the ED.    Patient confirms full code status.    Past Medical History    I have reviewed this patient's medical history and updated it with pertinent information if needed.   Past Medical History:   Diagnosis Date    COPD (chronic obstructive pulmonary disease) (H)     O2 use, 2L    Depression     DM2 (diabetes mellitus, type 2) (H)     Dyslipidemia     HTN (hypertension)     Nontraumatic intraventricular intracerebral hemorrhage (H)     due to AVM    SUBHA (obstructive sleep apnea)     Pulmonary hypertension (H)        Prior to Admission Medications   Prior to Admission Medications   Prescriptions Last Dose Informant Patient Reported? Taking?   Ascorbic Acid 500 MG CHEW 4/14/2025 Morning Nursing Home Yes Yes   Sig: Take 500 mg by mouth daily   Dextromethorphan-guaiFENesin (MUCINEX DM MAXIMUM STRENGTH)  MG TB12 4/14/2025 Morning Nursing Home Yes Yes   Sig: Take 1 tablet by mouth 2 times daily as needed   Dimethicone 5 % CREA  Nursing Home Yes Yes   Sig: Apply topically daily as needed Apply to bottom for skin irritation or reddened skin   FLUoxetine (PROZAC) 10 MG capsule 4/14/2025 Morning Nursing Home Yes Yes   Sig: Take 10 mg by mouth every morning Give with 20mg to equal 30mg    FLUoxetine 20 MG tablet 4/14/2025 [facility] Yes Yes   Sig: Take 20 mg by mouth every morning Give with 10mg to equal 30mg   Psyllium 33 % POWD Past Week Nursing Home Yes Yes   Sig: Take 1 packet by mouth 2 times daily (with meals) Mix 3.4grams/1 packet with liquid and take at 8am and 5pm   Vitamin D3 (CHOLECALCIFEROL) 125 MCG (5000 UT) tablet 4/14/2025 [facility] Yes Yes   Sig: Take 1 tablet by mouth daily   acetaminophen (TYLENOL) 325 MG tablet  Nursing Home No Yes   Sig: Take 2 tablets (650 mg) by mouth every 6 hours as needed for fever or pain   albuterol (PROAIR HFA/PROVENTIL HFA/VENTOLIN HFA) 108 (90 Base) MCG/ACT inhaler  halfway Yes Yes   Sig: Inhale 1 puff into the lungs every 6 hours as needed for shortness of breath   ammonium lactate (LAC-HYDRIN) 12 % external lotion 4/14/2025 [facility] Home Yes Yes   Sig: Apply topically daily Apply to legs   atorvastatin (LIPITOR) 40 MG tablet 4/14/2025 [facility] Yes Yes   Sig: Take 40 mg by mouth daily   calcium carbonate (TUMS) 500 MG chewable tablet  Nursing Home Yes Yes   Sig: Take 2 chew tab by mouth every 2 hours as needed for heartburn.   carboxymethylcellulose PF (REFRESH LIQUIGEL) 1 % ophthalmic gel 4/14/2025 [facility] Yes Yes   Sig: Place 1 drop into both eyes 4 times daily.   fluticasone (FLONASE) 50 MCG/ACT nasal spray 4/14/2025 [facility] Yes Yes   Sig: Spray 1 spray into both nostrils every morning   fluticasone-salmeterol (ADVAIR) 500-50 MCG/ACT inhaler 4/14/2025 [facility] Yes Yes   Sig: Inhale 1 puff into the lungs every 12 hours.   hydrOXYzine (ATARAX) 50 MG tablet  Nursing Home Yes Yes   Sig: Take 50 mg by mouth every 6 hours as needed for anxiety   hydrOXYzine HCl (ATARAX) 50 MG tablet 4/13/2025 Bedtime Nursing Home Yes Yes   Sig: Take 50 mg by mouth at bedtime.   loperamide (IMODIUM) 2 MG capsule  Nursing Home No Yes   Sig: Take 1 capsule (2 mg) by mouth 4 times daily as  needed for diarrhea   menthol-zinc oxide (CALMOSEPTINE) 0.44-20.6 % OINT ointment  Nursing Home Yes Yes   Sig: Apply topically 2 times daily as needed for skin protection   metFORMIN (GLUCOPHAGE) 500 MG tablet 4/14/2025 Morning Nursing Home Yes Yes   Sig: Take 2,000 mg by mouth daily (with breakfast).   multivitamin w/minerals (THERA-VIT-M) tablet 4/14/2025 Morning Nursing Home Yes Yes   Sig: Take 1 tablet by mouth daily.   mupirocin (BACTROBAN) 2 % external ointment 4/13/2025 Evening Nursing Home Yes Yes   Sig: Apply topically every evening. Under breasts   nystatin (MYCOSTATIN) 693706 UNIT/GM external powder  Nursing Home No Yes   Sig: Apply topically daily as needed Apply to under breasts, yeasty skin folds   potassium chloride (KLOR-CON) 20 MEQ packet 4/14/2025 Morning Nursing Home Yes Yes   Sig: Take 20 mEq by mouth 2 times daily (with meals) Mix with liquid and take at 8am and 5pm   sodium chloride (OCEAN) 0.65 % nasal spray  Nursing Home Yes Yes   Sig: Spray 1 spray into both nostrils daily as needed for congestion   terbinafine (LAMISIL) 1 % external cream Morning Nursing Home Yes Yes   Sig: Apply topically every morning. Under breasts      Facility-Administered Medications: None     Allergies   Allergies   Allergen Reactions    Bupropion Anaphylaxis    Sulfa Antibiotics        Physical Exam   Vital Signs: Temp: 99.1  F (37.3  C) Temp src: Oral BP: (!) 131/93 Pulse: 78   Resp: 26 SpO2: 96 % O2 Device: Nasal cannula    Weight: 224 lbs 0 oz    Constitutional: Awake, alert, cooperative, no apparent distress.    ENT: Normocephalic, without obvious abnormality, atraumatic. Normal sclera.    Neck: Supple, symmetrical, trachea midline  Pulmonary: No increased work of breathing, diminished  Cardiovascular: Regular rate and rhythm  GI: Normal bowel sounds, distended with large ventral hernia, (+) BS, abdominal dressing in place  Skin: Visualized skin appeared clear.  Neuro: Oriented x 3. Moves all extremities  spontaneously. No focal neuro deficits.  Psych:  Normal affect and mood  Extremities: Normal muscle tone. No gross deformities noted. Calves non-tender b/l.    Medical Decision Making       60 MINUTES SPENT BY ME on the date of service doing chart review, history, exam, documentation & further activities per the note.         Data   Data reviewed today: I reviewed all medications, new labs and imaging results over the last 24 hours. I personally reviewed no images or EKG's today.      I have personally reviewed the following data over the past 24 hrs:    7.8  \   11.6 (L)   / 211     142 99 9.6 /  191 (H)   2.7 (L) 35 (H) 0.76 \     ALT: 12 AST: 13 AP: 108 TBILI: 0.2   ALB: 3.2 (L) TOT PROTEIN: 6.1 (L) LIPASE: N/A     Trop: 18 (H) BNP: N/A       Imaging results reviewed over the past 24 hrs:   Recent Results (from the past 24 hours)   XR Chest 2 Views    Narrative    EXAM: XR CHEST 2 VIEWS  LOCATION: [facility]  DATE: 4/14/2025    INDICATION: left chest pain  COMPARISON: Chest x-rays, most recently 5/2/2024. CT dated 8/21/2018.      Impression    IMPRESSION: Shallow inspiration. Otherwise negative chest.

## 2025-04-14 NOTE — ED NOTES
"[facility]  ED Nurse Handoff Report    ED Chief complaint: Abnormal Labs      ED Diagnosis:   Final diagnoses:   Electrolyte abnormality   Acute electrocardiogram changes   Chest pain, unspecified type       Code Status: Full Code    Allergies:   Allergies   Allergen Reactions    Bupropion Anaphylaxis    Sulfa Antibiotics        Patient Story: Had routine labs drawn Friday, was notified this morning that K was 2.5.   Focused Assessment:  Asymptomatic, SR with arrythmia, hernia with wound to abdomen.     Treatments and/or interventions provided: PIV placed, labs drawn, K and mag replaced, Xray done  Patient's response to treatments and/or interventions: Tolerated    To be done/followed up on inpatient unit:  Will need wound cares to abdomen, electrolyte replacement    Does this patient have any cognitive concerns?: A&O, lives in a group home    Activity level - Baseline/Home:  Wheelchair and ale steady  Activity Level - Current:   Wheelchair and lift    Patient's Preferred language: English   Needed?: No    Isolation: None  Infection: Not Applicable  Patient tested for COVID 19 prior to admission: NO  Bariatric?: Yes    Vital Signs:   Vitals:    04/14/25 1130 04/14/25 1200 04/14/25 1400   BP: (!) 140/92 136/79 (!) 131/93   Pulse: 80 77 78   Resp: 28 26    Temp: 99.1  F (37.3  C)     TempSrc: Oral     SpO2: 94% 94% 96%   Weight: 101.6 kg (224 lb)     Height: 1.702 m (5' 7\")         Cardiac Rhythm: SR with arrythmia    Was the PSS-3 completed:   Yes  What interventions are required if any?               Family Comments: Sister Mabel is HCA- has been update via phone. Group home also updated via phone.   OBS brochure/video discussed/provided to patient/family: No              Name of person given brochure if not patient:               Relationship to patient:     For the majority of the shift this patient's behavior was Green.   Behavioral interventions performed were NA.    ED NURSE PHONE " NUMBER: [phone]

## 2025-04-15 ENCOUNTER — APPOINTMENT (OUTPATIENT)
Dept: CARDIOLOGY | Facility: CLINIC | Age: 70
End: 2025-04-15
Attending: PHYSICIAN ASSISTANT
Payer: COMMERCIAL

## 2025-04-15 LAB
ANION GAP SERPL CALCULATED.3IONS-SCNC: 6 MMOL/L (ref 7–15)
ATRIAL RATE - MUSE: 70 BPM
BASOPHILS # BLD AUTO: 0 10E3/UL (ref 0–0.2)
BASOPHILS NFR BLD AUTO: 0 %
BUN SERPL-MCNC: 9.3 MG/DL (ref 8–23)
CALCIUM SERPL-MCNC: 8.3 MG/DL (ref 8.8–10.4)
CHLORIDE SERPL-SCNC: 102 MMOL/L (ref 98–107)
CREAT SERPL-MCNC: 0.78 MG/DL (ref 0.51–0.95)
DIASTOLIC BLOOD PRESSURE - MUSE: NORMAL MMHG
EGFRCR SERPLBLD CKD-EPI 2021: 83 ML/MIN/1.73M2
EOSINOPHIL # BLD AUTO: 0.2 10E3/UL (ref 0–0.7)
EOSINOPHIL NFR BLD AUTO: 3 %
ERYTHROCYTE [DISTWIDTH] IN BLOOD BY AUTOMATED COUNT: 13.9 % (ref 10–15)
GLUCOSE BLDC GLUCOMTR-MCNC: 141 MG/DL (ref 70–99)
GLUCOSE BLDC GLUCOMTR-MCNC: 155 MG/DL (ref 70–99)
GLUCOSE BLDC GLUCOMTR-MCNC: 160 MG/DL (ref 70–99)
GLUCOSE BLDC GLUCOMTR-MCNC: 165 MG/DL (ref 70–99)
GLUCOSE SERPL-MCNC: 161 MG/DL (ref 70–99)
HCO3 SERPL-SCNC: 35 MMOL/L (ref 22–29)
HCT VFR BLD AUTO: 36.4 % (ref 35–47)
HGB BLD-MCNC: 11.2 G/DL (ref 11.7–15.7)
IMM GRANULOCYTES # BLD: 0 10E3/UL
IMM GRANULOCYTES NFR BLD: 1 %
INTERPRETATION ECG - MUSE: NORMAL
LVEF ECHO: NORMAL
LYMPHOCYTES # BLD AUTO: 1.6 10E3/UL (ref 0.8–5.3)
LYMPHOCYTES NFR BLD AUTO: 29 %
MAGNESIUM SERPL-MCNC: 2 MG/DL (ref 1.7–2.3)
MCH RBC QN AUTO: 27.5 PG (ref 26.5–33)
MCHC RBC AUTO-ENTMCNC: 30.8 G/DL (ref 31.5–36.5)
MCV RBC AUTO: 89 FL (ref 78–100)
MONOCYTES # BLD AUTO: 0.3 10E3/UL (ref 0–1.3)
MONOCYTES NFR BLD AUTO: 5 %
NEUTROPHILS # BLD AUTO: 3.5 10E3/UL (ref 1.6–8.3)
NEUTROPHILS NFR BLD AUTO: 62 %
NRBC # BLD AUTO: 0 10E3/UL
NRBC BLD AUTO-RTO: 0 /100
P AXIS - MUSE: 67 DEGREES
PLATELET # BLD AUTO: 204 10E3/UL (ref 150–450)
POTASSIUM SERPL-SCNC: 3.3 MMOL/L (ref 3.4–5.3)
POTASSIUM SERPL-SCNC: 3.3 MMOL/L (ref 3.4–5.3)
POTASSIUM SERPL-SCNC: 3.6 MMOL/L (ref 3.4–5.3)
PR INTERVAL - MUSE: 164 MS
QRS DURATION - MUSE: 80 MS
QT - MUSE: 448 MS
QTC - MUSE: 483 MS
R AXIS - MUSE: 18 DEGREES
RBC # BLD AUTO: 4.07 10E6/UL (ref 3.8–5.2)
SODIUM SERPL-SCNC: 143 MMOL/L (ref 135–145)
SYSTOLIC BLOOD PRESSURE - MUSE: NORMAL MMHG
T AXIS - MUSE: -63 DEGREES
VENTRICULAR RATE- MUSE: 70 BPM
WBC # BLD AUTO: 5.6 10E3/UL (ref 4–11)

## 2025-04-15 PROCEDURE — 93005 ELECTROCARDIOGRAM TRACING: CPT

## 2025-04-15 PROCEDURE — 80048 BASIC METABOLIC PNL TOTAL CA: CPT | Performed by: PHYSICIAN ASSISTANT

## 2025-04-15 PROCEDURE — 250N000013 HC RX MED GY IP 250 OP 250 PS 637: Performed by: PHYSICIAN ASSISTANT

## 2025-04-15 PROCEDURE — 250N000012 HC RX MED GY IP 250 OP 636 PS 637: Performed by: PHYSICIAN ASSISTANT

## 2025-04-15 PROCEDURE — 84132 ASSAY OF SERUM POTASSIUM: CPT | Performed by: STUDENT IN AN ORGANIZED HEALTH CARE EDUCATION/TRAINING PROGRAM

## 2025-04-15 PROCEDURE — 82962 GLUCOSE BLOOD TEST: CPT

## 2025-04-15 PROCEDURE — 93010 ELECTROCARDIOGRAM REPORT: CPT | Performed by: INTERNAL MEDICINE

## 2025-04-15 PROCEDURE — 36415 COLL VENOUS BLD VENIPUNCTURE: CPT | Performed by: PHYSICIAN ASSISTANT

## 2025-04-15 PROCEDURE — 999N000208 ECHOCARDIOGRAM COMPLETE

## 2025-04-15 PROCEDURE — 36415 COLL VENOUS BLD VENIPUNCTURE: CPT | Performed by: STUDENT IN AN ORGANIZED HEALTH CARE EDUCATION/TRAINING PROGRAM

## 2025-04-15 PROCEDURE — G0463 HOSPITAL OUTPT CLINIC VISIT: HCPCS

## 2025-04-15 PROCEDURE — G0378 HOSPITAL OBSERVATION PER HR: HCPCS

## 2025-04-15 PROCEDURE — 255N000002 HC RX 255 OP 636: Performed by: PHYSICIAN ASSISTANT

## 2025-04-15 PROCEDURE — 83735 ASSAY OF MAGNESIUM: CPT | Performed by: STUDENT IN AN ORGANIZED HEALTH CARE EDUCATION/TRAINING PROGRAM

## 2025-04-15 PROCEDURE — 85025 COMPLETE CBC W/AUTO DIFF WBC: CPT | Performed by: PHYSICIAN ASSISTANT

## 2025-04-15 PROCEDURE — 99233 SBSQ HOSP IP/OBS HIGH 50: CPT | Performed by: STUDENT IN AN ORGANIZED HEALTH CARE EDUCATION/TRAINING PROGRAM

## 2025-04-15 PROCEDURE — 250N000013 HC RX MED GY IP 250 OP 250 PS 637: Performed by: STUDENT IN AN ORGANIZED HEALTH CARE EDUCATION/TRAINING PROGRAM

## 2025-04-15 PROCEDURE — 93306 TTE W/DOPPLER COMPLETE: CPT | Mod: 26 | Performed by: INTERNAL MEDICINE

## 2025-04-15 RX ORDER — POTASSIUM CHLORIDE 1.5 G/1.58G
20 POWDER, FOR SOLUTION ORAL ONCE
Status: COMPLETED | OUTPATIENT
Start: 2025-04-15 | End: 2025-04-15

## 2025-04-15 RX ORDER — POTASSIUM CHLORIDE 1.5 G/1.58G
40 POWDER, FOR SOLUTION ORAL ONCE
Status: COMPLETED | OUTPATIENT
Start: 2025-04-15 | End: 2025-04-15

## 2025-04-15 RX ORDER — MAGNESIUM OXIDE 400 MG/1
400 TABLET ORAL EVERY 4 HOURS
Status: COMPLETED | OUTPATIENT
Start: 2025-04-15 | End: 2025-04-15

## 2025-04-15 RX ADMIN — PSYLLIUM HUSK 1 PACKET: 3.4 POWDER ORAL at 17:45

## 2025-04-15 RX ADMIN — PSYLLIUM HUSK 1 PACKET: 3.4 POWDER ORAL at 08:59

## 2025-04-15 RX ADMIN — POTASSIUM CHLORIDE 20 MEQ: 1.5 POWDER, FOR SOLUTION ORAL at 17:45

## 2025-04-15 RX ADMIN — HYDROXYZINE HYDROCHLORIDE 50 MG: 25 TABLET ORAL at 21:30

## 2025-04-15 RX ADMIN — FLUOXETINE 30 MG: 10 CAPSULE ORAL at 08:57

## 2025-04-15 RX ADMIN — Medication 400 MG: at 20:38

## 2025-04-15 RX ADMIN — INSULIN ASPART 1 UNITS: 100 INJECTION, SOLUTION INTRAVENOUS; SUBCUTANEOUS at 12:56

## 2025-04-15 RX ADMIN — POTASSIUM CHLORIDE 40 MEQ: 1.5 POWDER, FOR SOLUTION ORAL at 10:03

## 2025-04-15 RX ADMIN — INSULIN ASPART 1 UNITS: 100 INJECTION, SOLUTION INTRAVENOUS; SUBCUTANEOUS at 17:45

## 2025-04-15 RX ADMIN — POTASSIUM CHLORIDE 20 MEQ: 1.5 POWDER, FOR SOLUTION ORAL at 15:00

## 2025-04-15 RX ADMIN — FLUTICASONE FUROATE AND VILANTEROL TRIFENATATE 1 PUFF: 200; 25 POWDER RESPIRATORY (INHALATION) at 09:02

## 2025-04-15 RX ADMIN — INSULIN ASPART 1 UNITS: 100 INJECTION, SOLUTION INTRAVENOUS; SUBCUTANEOUS at 09:03

## 2025-04-15 RX ADMIN — POTASSIUM CHLORIDE 20 MEQ: 1.5 POWDER, FOR SOLUTION ORAL at 04:21

## 2025-04-15 RX ADMIN — POTASSIUM CHLORIDE 20 MEQ: 1.5 POWDER, FOR SOLUTION ORAL at 08:57

## 2025-04-15 RX ADMIN — LOPERAMIDE HYDROCHLORIDE 2 MG: 2 CAPSULE ORAL at 20:38

## 2025-04-15 RX ADMIN — PERFLUTREN 10 ML: 6.52 INJECTION, SUSPENSION INTRAVENOUS at 10:42

## 2025-04-15 RX ADMIN — Medication 400 MG: at 23:21

## 2025-04-15 RX ADMIN — POTASSIUM CHLORIDE 40 MEQ: 1.5 POWDER, FOR SOLUTION ORAL at 02:20

## 2025-04-15 ASSESSMENT — ACTIVITIES OF DAILY LIVING (ADL)
ADLS_ACUITY_SCORE: 67
DEPENDENT_IADLS:: CLEANING;COOKING;LAUNDRY;SHOPPING;MEAL PREPARATION;MEDICATION MANAGEMENT;MONEY MANAGEMENT;TRANSPORTATION
ADLS_ACUITY_SCORE: 71
ADLS_ACUITY_SCORE: 59
ADLS_ACUITY_SCORE: 71
ADLS_ACUITY_SCORE: 59
ADLS_ACUITY_SCORE: 71
ADLS_ACUITY_SCORE: 71
ADLS_ACUITY_SCORE: 59
ADLS_ACUITY_SCORE: 59
ADLS_ACUITY_SCORE: 71
ADLS_ACUITY_SCORE: 59
ADLS_ACUITY_SCORE: 59
ADLS_ACUITY_SCORE: 71
ADLS_ACUITY_SCORE: 67
ADLS_ACUITY_SCORE: 59
ADLS_ACUITY_SCORE: 67
ADLS_ACUITY_SCORE: 59
ADLS_ACUITY_SCORE: 59

## 2025-04-15 NOTE — PROGRESS NOTES
Arrived to unit from ED 1700.     PRIMARY Concern: Low K+ and mag  SAFETY RISK Concerns (fall risk, behaviors, etc.): Fall risk      Aggression Tool Color: Green  Isolation/Type: N/A  Tests/Procedures for NEXT shift: Next K draw is 2200, recheck Mag in AM. AM BMP  Consults? (Pending/following, signed-off?) WOC and SW/CC  Where is patient from? (Home, TCU, etc.): Group Home  Other Important info for NEXT shift: Pt's sister Mabel is legal guardian  Anticipated DC date & active delays: 1-2 days pending clinical progress  _____________________________________________________________________________  SUMMARY NOTE:   Orientation/Cognitive: A&Ox4  Observation Goals (Met/ Not Met): NOT MET  Mobility Level/Assist Equipment: Ax2 / lift. Able to use Pearl Steady at baseline  Antibiotics & Plan (IV/po, length of tx left): N/A  Pain Management: Denies  Tele/VS/O2: VSS on 2LPM O2. Baseline uses 0-2LPM O2. Tele: SR.  ABNL Lab/BG: . K: 2.7, replaced. Ma.3, replaced in ED. See above for recheck times  Diet: Mod Carb. Good appetite  Bowel/Bladder: Incontinent B/B. Loose stool the past few weeks, 2 episodes since arrival to unit  Skin Concerns:  Chronic abdomen wound, dressing dry and intact w/ small amount dried drainage. Mild groin redness. Yomi breast redness, interdry in place (placed at group home yesterday 25). Scattered bruising BUEs. Skin behind ears intact, no redness. Sacrum/coccyx intact   Drains/Devices: PIV SL

## 2025-04-15 NOTE — PROGRESS NOTES
Admission/Transfer from: ED    2 RN skin assessment completed. Yes    Name of 2nd RN: Abbie Good    Significant findings include: Chronic abdomen wound, dressing dry and intact w/ small amount dried drainage. Mild groin redness. Yomi breast redness, interdry in place (placed at group home yesterday 4/13/25). Scattered bruising BUEs. Skin behind ears intact, no redness. Sacrum/coccyx intact    Provider Paged for WOC Nurse Consult Order? No; WOC consult already placed for chronic abdominal wound

## 2025-04-15 NOTE — PROGRESS NOTES
Observation goals  PRIOR TO DISCHARGE        Comments: -diagnostic tests and consults completed and resulted  -vital signs normal or at patient baseline  -returns to baseline functional status  Nurse to notify provider when observation goals have been met and patient is ready for discharge.

## 2025-04-15 NOTE — PROGRESS NOTES
PRIMARY Concern: Chest pain(resolved) low K+ and mag  SAFETY RISK Concerns (fall risk, behaviors, etc.): Fall risk      Aggression Tool Color: Green  Isolation/Type: Enteric (pending Infection prevention consult  Tests/Procedures for NEXT shift: Following Labs. Echo results pending  Consults? (Pending/following, signed-off?) WOC and SW/CC, Infection prevention  Where is patient from? (Home, TCU, etc.): Group Home  Other Important info for NEXT shift: Pt's sister Mabel is legal guardian  Anticipated DC date & active delays: 1-2 days pending clinical progress  _____________________________________________________________________________  SUMMARY NOTE:   Orientation/Cognitive: A&Ox4  Observation Goals (Met/ Not Met): NOT MET  Mobility Level/Assist Equipment: Ax2 Pearl Steady. (Pts baseline)  Antibiotics & Plan (IV/po, length of tx left): N/A  Pain Management: Denies  Tele/VS/O2: VSS on 2LPM O2. Baseline uses 0-2LPM O2. Tele: SR.  ABNL Lab/BG: B>160> 155. K: 3.3>3.6, replaced. Ma.0  Diet: Mod Carb  Bowel/Bladder: Incontinent B/B. Loose stool the past few weeks,  Skin Concerns:  Chronic abdomen wound, dressing dry and intact w/ small amount dried drainage. Mild groin redness. Yomi breast redness, interdry in place (placed at group home yesterday 25). Scattered bruising BUEs. Skin behind ears intact, no redness. Sacrum/coccyx intact   Drains/Devices: PIV SL

## 2025-04-15 NOTE — CONSULTS
"[facility]  WOC Nurse Inpatient Assessment     Consulted for: Wound Abdomen     Summary: patient with POA chronic wound to abdomen laying over hernias, initial woc assessment 4/15    WOC nurse follow-up plan: weekly    Patient History (according to provider note(s):      \"69 year old female with PMH of COPD, depression, DM II, HLD, asthma, vertigo, obesity, HTN, SUBHA, pulmonary hypertension admitted on 4/14/25 with chest pain, hypokalemia, hypomagnesemia. \"    Assessment:      Areas visualized during today's visit: Focused: and Abdomen    Wound location: abdomen     Last photo: 4/15  Wound due to: Surgical Wound chronic   Wound history/plan of care: found with iodosorb in use with ABD pad   Wound base:  Dermis and Granulation tissue,      Palpation of the wound bed: normal and firm      Drainage: moderate     Description of drainage: serosanguinous and tan     Measurements (length x width x depth, in cm): 15  x 16  x  0.1 cm      Tunneling: N/A     Undermining: N/A  Periwound skin: Intact and Scar tissue      Color: pale and purple      Temperature: normal   Odor: none  Pain: mild and moderate, intermittent and tender  Pain interventions prior to dressing change: soaking, slow and gentle cares , and distraction  Treatment goal: Heal , Drainage control, and Infection control/prevention  STATUS: initial assessment  Supplies ordered: gathered and at bedside and applied to patient        Treatment Plan:     04/16/25 0600  Wound care  DAILY        Comments: Location: abdomen  Care: provided daily by primary RN  1. Cleanse with vashe and 4x4\" gauze  2. Apply Triad paste (#[id number]) to wounds and periwound skin  3. Cover with ABD pad, secure with medipore tape          Orders: Written    RECOMMEND PRIMARY TEAM ORDER: None, at this time  Education provided: plan of care  Discussed plan of care with: Patient and Nurse  Notify WOC if wound(s) deteriorate.  Nursing to notify the Provider(s) and " re-consult the [facility] Nurse if new skin concern.    DATA:     Current support surface: Standard  Standard gel mattress (Isoflex)  Containment of urine/stool: Incontinence Protocol and Incontinent pad in bed  BMI: Body mass index is 41.33 kg/m .   Active diet order: Orders Placed This Encounter      Moderate Consistent Carb (60 g CHO per Meal) Diet     Output: No intake/output data recorded.     Labs:   Recent Labs   Lab 04/15/25  0737 04/14/25  1147 04/10/25  1228   ALBUMIN  --  3.2* 3.1*   HGB 11.2* 11.6* 11.3*   WBC 5.6 7.8 5.9   A1C  --   --  7.3*     Pressure injury risk assessment:   Sensory Perception: 4-->no impairment  Moisture: 2-->very moist  Activity: 2-->chairfast  Mobility: 2-->very limited  Nutrition: 3-->adequate  Friction and Shear: 2-->potential problem  Eddi Score: 15    Maria T CWOCN   1st choice: Securely message with [employer] ([facility] [employer] [facility])   (2nd option: [facility] Office Phone [phone], messages checked periodically Mon-Fri 8a-4p)

## 2025-04-15 NOTE — DISCHARGE INSTRUCTIONS
"Wound cares  Ready to resume previous outpatient care plan   OR   Cleanse with vashe and 4x4\" gauze   Apply Triad paste to wounds and periwound skin  Cover with ABD pad, secure with tape   "

## 2025-04-15 NOTE — PROGRESS NOTES
Observation goals  PRIOR TO DISCHARGE        -diagnostic tests and consults completed and resulted: Not met  -vital signs normal or at patient baseline: Met  -returns to baseline functional status: Not met  Nurse to notify provider when observation goals have been met and patient is ready for discharge.

## 2025-04-15 NOTE — PROGRESS NOTES
[facility]    Medicine Progress Note - Hospitalist Service    Date of Admission:  4/14/2025    Assessment & Plan   Estephania Castillo is a 69 year old female with PMH of COPD, depression, DM II, HLD, asthma, vertigo, obesity, HTN, SUBHA, pulmonary hypertension admitted on 4/14/25 with chest pain, hypokalemia, hypomagnesemia.     Hypokalemia  Hypomagnesemia  Chronic diarrhea  *K 2.7, mag 1.3 on admission. She reports no change in her chronically loose stools No fevers, chills, abdominal pain, nausea, vomiting.  *Patient has chronic diarrhea at baseline. Has previously been seen by Adam SHAIKH, declined colonoscopy for further workup. She was started on Psyllium husk, but pt reports she has been refusing at times. She also has C. Diff colonization.  * Potassium still low on 4/15  - Continue PTA potassium chloride 20 mEq BID, may need higher dose at discharge   - Continue Psyllium   - RN replacement protocol  - Continuous telemetry  - Outpatient follow-up for chronic diarrhea     Chest pain  EKG changes  *Patient reports she had an episode of very sharp left sided chest discomfort last week which she attributed to gas, resolved with Tums. She has since been having very mild left sided discomfort only in the evening, not sharp. Lasts a few minutes at a time. No associated dyspnea or nausea. She has no cardiac history but is a former smoker and reportedly her father had an MI in his mid 40s.  *Troponin 21. EKG SR with sinus arrhythmia, non specific ST-T changes in inferior and anterolateral leads,  ms. Note she also has low mag and K.  * Echo with EF of 60%, poor image quality but no obvious WMAs.  * Repeat troponin flat  - Echocardiogram  - Continuous telemetry     DM II, non insulin dependent  *HbA1c was 7.3 on 4/10/25  *PTA Metformin 2000 mg daily  - Hold PTA Metformin - may be contributing to diarrhea (common side effect), consider alternative agent  - Accuchecks ACHS with sliding scale  "coverage  - Hypoglycemia protocol     Chronic abdominal wound, POA  Large ventral hernia  - WOC consult  - Outpatient general surgery follow up     COPD  Asthma  Chronic O2 dependence (2L)  - Continue PTA Advair, Albuterol PRN     Depression  Anxiety  - Continue PTA Prozac, Hydroxyzine     HLD  - Can resume PTA Atorvastatin on discharge          Observation Goals: -diagnostic tests and consults completed and resulted, -vital signs normal or at patient baseline, -returns to baseline functional status, Nurse to notify provider when observation goals have been met and patient is ready for discharge.  Diet: Moderate Consistent Carb (60 g CHO per Meal) Diet    DVT Prophylaxis: Pneumatic Compression Devices  Peralta Catheter: Not present  Lines: None     Cardiac Monitoring: ACTIVE order. Indication: Chest pain/ ACS rule out (24 hours)  Code Status: Full Code      Clinically Significant Risk Factors Present on Admission        # Hypokalemia: Lowest K = 2.7 mmol/L in last 2 days, will replace as needed    # Hypocalcemia: Lowest Ca = 8.3 mg/dL in last 2 days, will monitor and replace as appropriate   # Hypomagnesemia: Lowest Mg = 1.3 mg/dL in last 2 days, will replace as needed   # Hypoalbuminemia: Lowest albumin = 3.2 g/dL at 4/14/2025 11:47 AM, will monitor as appropriate              # DMII: A1C = 7.3 % (Ref range: <5.7 %) within past 6 months    # Severe Obesity: Estimated body mass index is 41.33 kg/m  as calculated from the following:    Height as of this encounter: 1.702 m (5' 7\").    Weight as of this encounter: 119.7 kg (263 lb 14.3 oz).              Social Drivers of Health    Tobacco Use: High Risk (12/18/2024)    Received from [facility]    Patient History     Smoking Tobacco Use: Every Day     Smokeless Tobacco Use: Never    [facility]    Social Connections          Disposition Plan     Medically Ready for " Discharge: Anticipated Tomorrow        Hillary Orozco MD  Hospitalist Service  [facility]  Securely message with I and love and you (more info)  Text page via [facility] Paging/Directory   ______________________________________________________________________    Interval History   NAEO. Pt still has diarrhea, unchanged from chronic baseline. No abdominal pain. No nausea or vomiting. Tolerating PO. No chest pain or shortness of breath.     Physical Exam   Vital Signs: Temp: 97.9  F (36.6  C) Temp src: Oral BP: (!) 145/68 Pulse: 80   Resp: 16 SpO2: 95 % O2 Device: Nasal cannula Oxygen Delivery: 2 LPM  Weight: 263 lbs 14.25 oz    Constitutional: Awake, alert, cooperative, no apparent distress.  Eyes: Conjunctiva and pupils examined and normal.  HEENT: Moist mucous membranes, normal dentition.  Respiratory: Clear to auscultation bilaterally, no crackles or wheezing.  Cardiovascular: Regular rate and rhythm, normal S1 and S2, and no murmur noted.  GI: Soft, large ventral hernia, large open abdominal wall wound, not fully assess as it was bandaged.   Skin: No rashes, no cyanosis, no edema.  Musculoskeletal: No joint swelling, erythema or tenderness.  Neurologic: Cranial nerves 2-12 intact, normal strength and sensation.  Psychiatric: Alert, oriented to person, place and time, no obvious anxiety or depression.    Medical Decision Making       50 MINUTES SPENT BY ME on the date of service doing chart review, history, exam, documentation & further activities per the note.      Data     I have personally reviewed the following data over the past 24 hrs:    5.6  \   11.2 (L)   / 204     143 102 9.3 /  160 (H)   3.3 (L); 3.3 (L) 35 (H) 0.78 \     Trop: 18 (H) BNP: N/A       Imaging results reviewed over the past 24 hrs:   Recent Results (from the past 24 hours)   XR Chest 2 Views    Narrative    EXAM: XR CHEST 2 VIEWS  LOCATION: [facility]  DATE: 4/14/2025    INDICATION: left chest  pain  COMPARISON: Chest x-rays, most recently 2024. CT dated 2018.      Impression    IMPRESSION: Shallow inspiration. Otherwise negative chest.   Echocardiogram Complete   Result Value    LVEF  60%    [facility]    [id number]  [id number]  [id number]^DESIREE^LINA^SEGUN     [facility]  Echocardiography Laboratory  [address] [address]     Name: SHERRI MARTINEZ  MRN: [id number]  : 1955  Study Date: 04/15/2025 09:27 AM  Age: 69 yrs  Gender: Female  Patient Location: [facility]  Reason For Study: Chest Pain  Ordering Physician: LINA RAMÍREZ  Referring Physician: valentin Rubio  Performed By: Panda Burroughs     BSA: 2.1 m2  Height: 67 in  Weight: 225 lb  HR: 80  BP: 122/72 mmHg  ______________________________________________________________________________  Procedure  Echocardiogram with two-dimensional, color and spectral Doppler. Definity (NDC  #[id number]) given intravenously.  ______________________________________________________________________________  Interpretation Summary     1. The left ventricle is normal in size. The visual ejection fraction is  estimated at 60%. Limited views of LV even with contrast. No major areas of  hypokinesis, but sensitivity decreased due to image quality.  2. The right ventricle is normal in structure, function and size.  3. No valve disease.     No previous echo for comparison.  ______________________________________________________________________________  Left Ventricle  The left ventricle is normal in size. There is borderline concentric left  ventricular hypertrophy. The visual ejection fraction is estimated at 60%.  Left ventricular diastolic function is indeterminate. Limited views of LV even  with contrast. No major areas of hypokinesis, but sensitivity decreased due to  image quality.     Right Ventricle  The right ventricle is normal in structure, function and size.     Atria  The left atrium is mildly dilated.  Right atrial size is normal. There is no  atrial shunt seen.     Mitral Valve  The mitral valve is normal in structure and function.     Tricuspid Valve  The tricuspid valve is normal in structure and function.     Aortic Valve  The aortic valve is normal in structure and function.     Pulmonic Valve  The pulmonic valve is not well visualized.     Vessels  Normal ascending, transverse (arch), and descending aorta. Inferior vena cava  not well visualized for estimation of right atrial pressure.     Pericardium  There is no pericardial effusion.     Rhythm  Sinus rhythm was noted.  ______________________________________________________________________________  MMode/2D Measurements & Calculations  IVSd: 1.2 cm     LVIDd: 4.9 cm  LVIDs: 3.3 cm  LVPWd: 1.1 cm  FS: 33.3 %  LV mass(C)d: 213.4 grams  LV mass(C)dI: 100.4 grams/m2  Ao root diam: 3.0 cm  asc Aorta Diam: 3.4 cm  LVOT diam: 2.0 cm  LVOT area: 3.1 cm2  Ao root diam index Ht(cm/m): 1.8  Ao root diam index BSA (cm/m2): 1.4  Asc Ao diam index BSA (cm/m2): 1.6  Asc Ao diam index Ht(cm/m): 2.0  RV Base: 3.4 cm  RWT: 0.45  TAPSE: 1.1 cm     Doppler Measurements & Calculations  MV E max markel: 52.3 cm/sec  MV A max markel: 61.7 cm/sec  MV E/A: 0.85     MV dec time: 0.20 sec  Ao V2 max: 143.0 cm/sec  Ao max P.0 mmHg  Ao V2 mean: 93.7 cm/sec  Ao mean P.0 mmHg  Ao V2 VTI: 25.2 cm  LINDA(I,D): 2.2 cm2  LINDA(V,D): 2.1 cm2  LV V1 max PG: 3.6 mmHg  LV V1 max: 95.2 cm/sec  LV V1 VTI: 18.0 cm  SV(LVOT): 56.5 ml  SI(LVOT): 26.6 ml/m2  PA acc time: 0.07 sec  AV Markel Ratio (DI): 0.67  LINDA Index (cm2/m2): 1.1  E/E' av.9  Lateral E/e': 3.8  Medial E/e': 8.0  RV S Markel: 12.1 cm/sec     ______________________________________________________________________________  Report approved by: Edwin Chavira MD on 04/15/2025 11:34 AM

## 2025-04-15 NOTE — PROGRESS NOTES
Summary:  Admitted for a potassium of 2.5     Orientation: A/O x4, denies pain    Vitals/Tele: VSS on ex on 2L NC, Tele: SR    IV Access/drains: R PIV SL    Diet: Mod carb    Mobility: A x2, sliding scale lift, T/Repo    GI/: Incontinent of B/B, x2 loose BM this shift    Wound/Skin: Abd. Wound  dressing w/ small dry drainage, rash/redness to groin area> BUE scattered bruising, Below R ear scab. Sacrum/coccyx intact    Consults: WOC/SW    Discharge Plan: TBD    See Flow sheets for assessment

## 2025-04-15 NOTE — CONSULTS
Care Management Initial Consult    General Information  Assessment completed with: Care Team Member, Other (Guardian),    Type of CM/SW Visit: Initial Assessment    Primary Care Provider verified and updated as needed:     Readmission within the last 30 days:        Reason for Consult: discharge planning  Advance Care Planning: Advance Care Planning Reviewed: present on chart          Communication Assessment  Patient's communication style: spoken language (English or Bilingual)    Hearing Difficulty or Deaf: no        Cognitive  Cognitive/Neuro/Behavioral: WDL  Level of Consciousness: alert  Arousal Level: opens eyes spontaneously  Orientation: oriented x 4  Mood/Behavior: calm, cooperative     Speech: logical, spontaneous    Living Environment:   People in home: facility resident     Current living Arrangements: assisted living, group home  Name of Facility: [facility]   Able to return to prior arrangements: yes       Family/Social Support:  Care provided by: other (see comments) (facility staff)  Provides care for: no one, unable/limited ability to care for self  Marital Status: Single  Support system: Facility resident(s)/Staff, Guardian          Description of Support System: Supportive    Support Assessment: Adequate family and caregiver support    Current Resources:   Patient receiving home care services: No        Community Resources: Group Home  Equipment currently used at home: wheelchair, power  Supplies currently used at home:      Employment/Financial:  Employment Status: disabled        Financial Concerns:             Does the patient's insurance plan have a 3 day qualifying hospital stay waiver?  Yes     Which insurance plan 3 day waiver is available? Alternative insurance waiver    Will the waiver be used for post-acute placement? No    Lifestyle & Psychosocial Needs:  Social Drivers of Health     Food Insecurity: Low Risk  (4/14/2025)    Food Insecurity     Within the past 12 months, did you worry  that your food would run out before you got money to buy more?: No     Within the past 12 months, did the food you bought just not last and you didn t have money to get more?: No   Depression: Not on file   Housing Stability: Low Risk  (4/14/2025)    Housing Stability     Do you have housing? : Yes     Are you worried about losing your housing?: No   Tobacco Use: High Risk (12/18/2024)    Received from [facility]    Patient History     Smoking Tobacco Use: Every Day     Smokeless Tobacco Use: Never     Passive Exposure: Not on file   Financial Resource Strain: Low Risk  (4/14/2025)    Financial Resource Strain     Within the past 12 months, have you or your family members you live with been unable to get utilities (heat, electricity) when it was really needed?: No   Alcohol Use: Not on file   Transportation Needs: Low Risk  (4/14/2025)    Transportation Needs     Within the past 12 months, has lack of transportation kept you from medical appointments, getting your medicines, non-medical meetings or appointments, work, or from getting things that you need?: No   Physical Activity: Not on file   Interpersonal Safety: Not on file   Stress: Not on file   Social Connections: Unknown (4/10/2023)    [facility]    Social Connections     Frequency of Communication with Friends and Family: Not on file   Health Literacy: Not on file       Functional Status:  Prior to admission patient needed assistance:   Dependent ADLs:: Bathing, Dressing, Grooming, Incontinence, Positioning, Transfers, Wheelchair-with assist, Toileting  Dependent IADLs:: Cleaning, Cooking, Laundry, Shopping, Meal Preparation, Medication Management, Money Management, Transportation       Mental Health Status:          Chemical Dependency Status:                Values/Beliefs:  Spiritual, Cultural Beliefs, Yazidism Practices, Values that affect care:                  Discussed  Partnership in Safe Discharge Planning  document with patient/family: No    Additional Information:  Patient was admitted on 4/14/25 with chest pain, hypokalemia, hypomagnesemia, per H&P.     Patient is a resident of the [facility] assisted [facility]. Spoke with RN (ph: [phone]) who informs pt has lived there for about 3 years. Pt uses a ale steady at baseline and has a power wheelchair. RN reports pt does not get out of bed much. Pt is independent with feeding, gets help with brief changes, pt uses oxygen at baseline and does need assistance with managing. Primary care is through [facility]. RN informed that pt will need transport at discharge, they prefer residents return by 1500 to ensure an RN is on site. They use [facility] in [place]. They would like a call once it's known when pt is discharging, they are aware it could be tomorrow. They would like discharge orders faxed to them at Fax # [phone]).     Spoke with guardian Mabel to introduce role. Mabel is aware a discharge may occur tomorrow and that we will give her a call once we know more. She would like to talk with the bedside RN to see how pt is doing today, provided with RN station phone number. She plans to call around 1400.     Ria Lopez, TED  Social Work  [facility]

## 2025-04-15 NOTE — CONSULTS
Infection Prevention Note:  Patient tested positive for C. Diff on 4/29/24.  That was about a year ago and she was treated for this infection. With a new bout of diarrhea it is advised to follow our C. Diff algorithm for possible testing.      Enteric precautions is necessary if an infectious agent is suspected as the cause for the diarrhea.    Virginia Irby, Infection Prevention on 4/15/2025 at 12:01 PM

## 2025-04-16 VITALS
DIASTOLIC BLOOD PRESSURE: 63 MMHG | SYSTOLIC BLOOD PRESSURE: 106 MMHG | OXYGEN SATURATION: 94 % | RESPIRATION RATE: 16 BRPM | HEART RATE: 82 BPM | TEMPERATURE: 97.5 F | WEIGHT: 263.89 LBS | BODY MASS INDEX: 41.42 KG/M2 | HEIGHT: 67 IN

## 2025-04-16 LAB
ANION GAP SERPL CALCULATED.3IONS-SCNC: 6 MMOL/L (ref 7–15)
BUN SERPL-MCNC: 9.2 MG/DL (ref 8–23)
CALCIUM SERPL-MCNC: 8.4 MG/DL (ref 8.8–10.4)
CHLORIDE SERPL-SCNC: 102 MMOL/L (ref 98–107)
CREAT SERPL-MCNC: 0.79 MG/DL (ref 0.51–0.95)
EGFRCR SERPLBLD CKD-EPI 2021: 82 ML/MIN/1.73M2
ERYTHROCYTE [DISTWIDTH] IN BLOOD BY AUTOMATED COUNT: 13.9 % (ref 10–15)
GLUCOSE BLDC GLUCOMTR-MCNC: 156 MG/DL (ref 70–99)
GLUCOSE BLDC GLUCOMTR-MCNC: 204 MG/DL (ref 70–99)
GLUCOSE SERPL-MCNC: 161 MG/DL (ref 70–99)
HCO3 SERPL-SCNC: 33 MMOL/L (ref 22–29)
HCT VFR BLD AUTO: 36.7 % (ref 35–47)
HGB BLD-MCNC: 11.6 G/DL (ref 11.7–15.7)
MAGNESIUM SERPL-MCNC: 2 MG/DL (ref 1.7–2.3)
MCH RBC QN AUTO: 28.2 PG (ref 26.5–33)
MCHC RBC AUTO-ENTMCNC: 31.6 G/DL (ref 31.5–36.5)
MCV RBC AUTO: 89 FL (ref 78–100)
PLATELET # BLD AUTO: 204 10E3/UL (ref 150–450)
POTASSIUM SERPL-SCNC: 3.3 MMOL/L (ref 3.4–5.3)
POTASSIUM SERPL-SCNC: 3.6 MMOL/L (ref 3.4–5.3)
RBC # BLD AUTO: 4.12 10E6/UL (ref 3.8–5.2)
SODIUM SERPL-SCNC: 141 MMOL/L (ref 135–145)
WBC # BLD AUTO: 6.1 10E3/UL (ref 4–11)

## 2025-04-16 PROCEDURE — 99239 HOSP IP/OBS DSCHRG MGMT >30: CPT | Performed by: STUDENT IN AN ORGANIZED HEALTH CARE EDUCATION/TRAINING PROGRAM

## 2025-04-16 PROCEDURE — 85018 HEMOGLOBIN: CPT | Performed by: STUDENT IN AN ORGANIZED HEALTH CARE EDUCATION/TRAINING PROGRAM

## 2025-04-16 PROCEDURE — 80048 BASIC METABOLIC PNL TOTAL CA: CPT | Performed by: STUDENT IN AN ORGANIZED HEALTH CARE EDUCATION/TRAINING PROGRAM

## 2025-04-16 PROCEDURE — 250N000013 HC RX MED GY IP 250 OP 250 PS 637: Performed by: STUDENT IN AN ORGANIZED HEALTH CARE EDUCATION/TRAINING PROGRAM

## 2025-04-16 PROCEDURE — G0378 HOSPITAL OBSERVATION PER HR: HCPCS

## 2025-04-16 PROCEDURE — 250N000013 HC RX MED GY IP 250 OP 250 PS 637: Performed by: PHYSICIAN ASSISTANT

## 2025-04-16 PROCEDURE — 83735 ASSAY OF MAGNESIUM: CPT | Performed by: STUDENT IN AN ORGANIZED HEALTH CARE EDUCATION/TRAINING PROGRAM

## 2025-04-16 PROCEDURE — 36415 COLL VENOUS BLD VENIPUNCTURE: CPT | Performed by: STUDENT IN AN ORGANIZED HEALTH CARE EDUCATION/TRAINING PROGRAM

## 2025-04-16 PROCEDURE — 84132 ASSAY OF SERUM POTASSIUM: CPT | Performed by: STUDENT IN AN ORGANIZED HEALTH CARE EDUCATION/TRAINING PROGRAM

## 2025-04-16 PROCEDURE — 82962 GLUCOSE BLOOD TEST: CPT

## 2025-04-16 RX ORDER — POTASSIUM CHLORIDE 1.5 G/1.58G
40 POWDER, FOR SOLUTION ORAL ONCE
Status: COMPLETED | OUTPATIENT
Start: 2025-04-16 | End: 2025-04-16

## 2025-04-16 RX ORDER — POTASSIUM CHLORIDE 1.5 G/1.58G
40 POWDER, FOR SOLUTION ORAL 2 TIMES DAILY
DISCHARGE
Start: 2025-04-16

## 2025-04-16 RX ADMIN — POTASSIUM CHLORIDE 20 MEQ: 1.5 POWDER, FOR SOLUTION ORAL at 11:17

## 2025-04-16 RX ADMIN — POTASSIUM CHLORIDE 40 MEQ: 1.5 POWDER, FOR SOLUTION ORAL at 08:47

## 2025-04-16 RX ADMIN — FLUTICASONE FUROATE AND VILANTEROL TRIFENATATE 1 PUFF: 200; 25 POWDER RESPIRATORY (INHALATION) at 08:46

## 2025-04-16 RX ADMIN — INSULIN ASPART 2 UNITS: 100 INJECTION, SOLUTION INTRAVENOUS; SUBCUTANEOUS at 12:23

## 2025-04-16 RX ADMIN — PSYLLIUM HUSK 1 PACKET: 3.4 POWDER ORAL at 08:46

## 2025-04-16 RX ADMIN — FLUOXETINE 30 MG: 10 CAPSULE ORAL at 08:47

## 2025-04-16 RX ADMIN — INSULIN ASPART 1 UNITS: 100 INJECTION, SOLUTION INTRAVENOUS; SUBCUTANEOUS at 08:52

## 2025-04-16 ASSESSMENT — ACTIVITIES OF DAILY LIVING (ADL)
ADLS_ACUITY_SCORE: 77
ADLS_ACUITY_SCORE: 71
ADLS_ACUITY_SCORE: 71
ADLS_ACUITY_SCORE: 77
ADLS_ACUITY_SCORE: 77
ADLS_ACUITY_SCORE: 71
ADLS_ACUITY_SCORE: 71
ADLS_ACUITY_SCORE: 77
ADLS_ACUITY_SCORE: 77
ADLS_ACUITY_SCORE: 71

## 2025-04-16 NOTE — PLAN OF CARE
"Pt refused wound care dressing change this morning. Pt states she only gets dressing changed every other day at her facility. Stating, \"why would you change it now, they just changed it yesterday and it is still good.\" Writer explained that there are orders for daily wound care in the hospital. Pt stated shed perfer to just have dressing changed once she gets to her facility.   "

## 2025-04-16 NOTE — PLAN OF CARE
Goal Outcome Evaluation:  PRIMARY Concern: Chest pain. Electrolyte abnormalities   SAFETY RISK Concerns (fall risk, behaviors, etc.): Fall risk      Aggression Tool Color: Green  Isolation/Type: Enteric (C-diff colonization)  Tests/Procedures for NEXT shift: Replace electrolytes per protocol  Consults? (Pending/following, signed-off?) WOC and SW/CC following, Infection prevention note in  Where is patient from? (Home, TCU, etc.): Group Home  Other Important info for NEXT shift: Pt's sister Mabel is legal guardian  Anticipated DC date & active delays: Possibly today back to group home  ________________________________________________________________________    SUMMARY NOTE:   Orientation/Cognitive: A&O x4  Observation Goals (Met/ Not Met): Not met  Mobility Level/Assist Equipment: Ax2 Pearl Steady. (baseline)  Antibiotics & Plan (IV/po, length of tx left): N/A  Pain Management: Denies  Tele/VS/O2: VSS on 2LPM O2. Baseline uses 0-2LPM O2. Tele: SR.  ABNL Lab/BG: -refused overnight BG. K: 3.3>3.6, replaced. Ma.0; replaced and recheck in AM  Diet: Mod Carb  Bowel/Bladder: Incontinent B/B. Loose stool the past few weeks,  Skin Concerns:  Chronic abdomen wound, dressing dry and intact w/ small amount dried drainage. Mild groin redness. Yomi breast redness, interdry in place (placed at group home yesterday 25). Scattered bruising BUEs. Skin behind ears intact, no redness. Sacrum/coccyx intact   Drains/Devices: PIV SL    Observation goals  PRIOR TO DISCHARGE        Comments:   -diagnostic tests and consults completed and resulted not met, SW following  -vital signs normal or at patient baseline met  -returns to baseline functional status met  Nurse to notify provider when observation goals have been met and patient is ready for discharge.

## 2025-04-16 NOTE — PLAN OF CARE
Goal Outcome Evaluation:      Plan of Care Reviewed With: patient    Discharge packet sent with pt. Pt discharging back to group home vie [facility] stretcher ride.

## 2025-04-16 NOTE — PROGRESS NOTES
Care Management Follow Up    Length of Stay (days): 0    Expected Discharge Date: 04/16/2025     Concerns to be Addressed:       Patient plan of care discussed at interdisciplinary rounds: Yes    Anticipated Discharge Disposition: Assisted Living, Group Home              Anticipated Discharge Services: Transportation Services  Anticipated Discharge DME:      Patient/family educated on Medicare website which has current facility and service quality ratings: no  Education Provided on the Discharge Plan:    Patient/Family in Agreement with the Plan: yes    Referrals Placed by CM/SW:    Private pay costs discussed: Not applicable    Discussed  Partnership in Safe Discharge Planning  document with patient/family: Yes: verbally     Handoff Completed: No, handoff not indicated or clinically appropriate    Additional Information:  Writer was informed that patient was discharge today. Writer called number of DON at [phone]. It was not answered and could not leave a message. Writer researched patient's chart and found paperwork from the [facility]. Writer called [phone] and spoke to [facility]. Writer updated that patient is discharging at 14:37 to 15:20 via [facility] wheelchair back to [facility]. Writer faxed the orders to [phone]. Writer noted patient is open to [facility] for Home RN for wound care. Writer called [facility] at [phone]. They will pull orders from Epic. There is no change in frequency.   Writer was informed that patient can not do a wheelchair ride, ride is now a stretcher ride for the same time.  Writer contacted patient's guardian, Mabel of patient returning to [facility]. She is happy that things are under control and that patient is returning.    Next Steps: discharge back to her Group Home.    Larisa Chavira, RN

## 2025-04-16 NOTE — PLAN OF CARE
Goal Outcome Evaluation:  PRIMARY Concern: Chest pain. Electrolyte abnormalities   SAFETY RISK Concerns (fall risk, behaviors, etc.): Fall risk      Aggression Tool Color: yellow  Isolation/Type: Enteric (C-diff colonization)  Tests/Procedures for NEXT shift: Replace electrolytes per protocol  Consults? (Pending/following, signed-off?) WOC and SW/CC following, Infection prevention note in  Where is patient from? (Home, TCU, etc.): Group Home  Other Important info for NEXT shift: Pt's sister Mabel is legal guardian  Anticipated DC date & active delays: Pt will be going back to her facility today. Stretcher ride scheduled for 2:30-3:30   ________________________________________________________________________    SUMMARY NOTE:   Orientation/Cognitive: A&O x4  Observation Goals (Met/ Not Met): Not met  Mobility Level/Assist Equipment: Ax2 Pearl Steady. (baseline)  Antibiotics & Plan (IV/po, length of tx left): N/A  Pain Management: Denies  Tele/VS/O2: VSS on 2LPM O2. Baseline uses 0-2LPM O2. Tele: SR.  ABNL Lab/BG: -refused overnight BG. K: 3.6>3.3, replaced. Ma.0  Diet: Mod Carb  Bowel/Bladder: Incontinent B/B. Loose stool the past few weeks,  Skin Concerns:  Chronic abdomen wound, dressing dry and intact w/ small amount dried drainage. Mild groin redness. Yomi breast redness, interdry in place (placed at group home yesterday 25). Scattered bruising BUEs. Skin behind ears intact, no redness. Sacrum/coccyx intact   Drains/Devices: PIV SL    Observation goals  PRIOR TO DISCHARGE        Comments:   -diagnostic tests and consults completed and resulted: Met  -vital signs normal or at patient baseline met  -returns to baseline functional status met  Nurse to notify provider when observation goals have been met and patient is ready for discharge.      Refill request from pharmacy for:  atorvastatin (LIPITOR) 40 MG tablet    Last office visit: 02/06/18  Last refill: 07/07/18  Upcoming visit: No upcoming visit scheduled  Last Lipid panel: 02/06/18    Medication has been sent to pharmacy per protocol.

## 2025-04-16 NOTE — PROGRESS NOTES
"Writer entered pt's room to do incontinent care but pt refused cares and became agitated. \"Get out and leave me alone\"  "

## 2025-04-16 NOTE — PROGRESS NOTES
Observation goals  PRIOR TO DISCHARGE        Comments:   -diagnostic tests and consults completed and resulted not met, SW following  -vital signs normal or at patient baseline met  -returns to baseline functional status met  Nurse to notify provider when observation goals have been met and patient is ready for discharge.

## 2025-05-02 NOTE — DISCHARGE SUMMARY
"[facility]  Hospitalist Discharge Summary      Date of Admission:  4/14/2025  Date of Discharge:  4/16/2025  3:17 PM  Discharging Provider: Hillary Orozco MD  Discharge Service: Hospitalist Service    Discharge Diagnoses   Hypokalemia  Hypomagnesemia  Chronic diarrhea  Chest pain  EKG changes  DM II, non insulin dependent   Chronic abdominal wound, POA  Large ventral hernia  COPD  Asthma  Chronic O2 dependence (2L)  Depression  Anxiety  HLD     Clinically Significant Risk Factors     # DMII: A1C = 7.3 % (Ref range: <5.7 %) within past 6 months  # Morbid Obesity: Estimated body mass index is 41.33 kg/m  as calculated from the following:    Height as of this encounter: 1.702 m (5' 7\").    Weight as of this encounter: 119.7 kg (263 lb 14.3 oz).       Follow-ups Needed After Discharge   Follow-up Appointments       Follow Up and recommended labs and tests      Follow up with jail physician.  The following labs/tests are recommended: BMP to monitor potassium levels. Would recommend checking BMP in one week.                Discharge Disposition   Discharged to long-term care facility  Condition at discharge: Stable    Hospital Course   Estephania Castillo is a 69 year old female with PMH of COPD, depression, DM II, HLD, asthma, vertigo, obesity, HTN, SUBHA, pulmonary hypertension admitted on 4/14/25 with chest pain, hypokalemia, hypomagnesemia.     Hypokalemia  Hypomagnesemia  Chronic diarrhea  *K 2.7, mag 1.3 on admission. She reports no change in her chronically loose stools No fevers, chills, abdominal pain, nausea, vomiting.  *Patient has chronic diarrhea at baseline. Has previously been seen by Adam SHAIKH, declined colonoscopy for further workup. She was started on Psyllium husk, but pt reports she has been refusing at times. She also has C. Diff colonization.  * Potassium improved at the time of discharge but pt required quite a lot of replacement while in the hospital   - Increase " PTA potassium chloride to 40 mEq BID  - Continue Psyllium   - Outpatient follow-up for chronic diarrhea  - Continue regular outpatient monitoring of potassium      Chest pain  EKG changes  *Patient reports she had an episode of very sharp left sided chest discomfort last week which she attributed to gas, resolved with Tums. She has since been having very mild left sided discomfort only in the evening, not sharp. Lasts a few minutes at a time. No associated dyspnea or nausea. She has no cardiac history but is a former smoker and reportedly her father had an MI in his mid 40s.  *Troponin 21. EKG SR with sinus arrhythmia, non specific ST-T changes in inferior and anterolateral leads,  ms. Note she also has low mag and K.  * Echo with EF of 60%, poor image quality but no obvious WMAs.  * Repeat troponin flat  - NO additional workup required     DM II, non insulin dependent  *HbA1c was 7.3 on 4/10/25  *PTA Metformin 2000 mg daily  - Resume PTA Metformin at discharge     Chronic abdominal wound, POA  Large ventral hernia  - WOC consult  - Outpatient general surgery follow up     COPD  Asthma  Chronic O2 dependence (2L)  - Continue PTA Advair, Albuterol PRN     Depression  Anxiety  - Continue PTA Prozac, Hydroxyzine     HLD  - Can resume PTA Atorvastatin on discharge    Consultations This Hospital Stay   CARE MANAGEMENT / SOCIAL WORK IP CONSULT  WOUND OSTOMY CONTINENCE NURSE  IP CONSULT  INFECTION PREVENTION IP CONSULT    Code Status   Prior    Time Spent on this Encounter   I, Hillary Orozco MD, personally saw the patient today and spent greater than 30 minutes discharging this patient.       Hillary Orozco MD  [facility] EXTENDED RECOVERY AND SHORT STAY  [address] [place] [id number]  Phone: [phone]  ______________________________________________________________________    Physical Exam   Vital Signs: /63 (BP Location: Right arm, Patient Position:  "Semi-Brown's, Cuff Size: Adult Regular)   Pulse 82   Temp 97.5  F (36.4  C) (Oral)   Resp 16   Ht 1.702 m (5' 7\")   Wt 119.7 kg (263 lb 14.3 oz)   SpO2 94%   BMI 41.33 kg/m      Weight: 263 lbs 14.25 oz  Constitutional: Awake, alert, cooperative, no apparent distress.  Eyes: Conjunctiva and pupils examined and normal.  HEENT: Moist mucous membranes, normal dentition.  Respiratory: Clear to auscultation bilaterally, no crackles or wheezing.  Cardiovascular: Regular rate and rhythm, normal S1 and S2, and no murmur noted.  GI: Soft, large ventral hernia, large open abdominal wall wound, not fully assessed as it was bandaged.   Skin: No rashes, no cyanosis, no edema.  Musculoskeletal: No joint swelling, erythema or tenderness.  Neurologic: Cranial nerves 2-12 intact, normal strength and sensation.  Psychiatric: Alert, oriented to person, place and time, no obvious anxiety or depression.    Primary Care Physician   ORALIA SANDOVAL    Discharge Orders      Basic metabolic panel     General info for SNF    Length of Stay Estimate: Long Term Care  Condition at Discharge: Stable  Level of care:skilled   Rehabilitation Potential: Fair  Admission H&P remains valid and up-to-date: Yes  Recent Chemotherapy: N/A  Use Nursing Home Standing Orders: Yes     Follow Up and recommended labs and tests    Follow up with prison physician.  The following labs/tests are recommended: BMP to monitor potassium levels. Would recommend checking BMP in one week.     Reason for your hospital stay    You were hospitalized for hypokalemia (low potassium)     Activity - Up with nursing assistance     Resume Home Care Services     Diet    Follow this diet upon discharge: Current Diet:Orders Placed This Encounter      Moderate Consistent Carb (60 g CHO per Meal) Diet       Significant Results and Procedures   Most Recent 3 CBC's:  Recent Labs   Lab Test 04/16/25  0652 04/15/25  0737 04/14/25  1147   WBC 6.1 5.6 7.8   HGB 11.6* 11.2* 11.6* "   MCV 89 89 88    204 211     Most Recent 3 BMP's:  Recent Labs   Lab Test 25  1446 25  1133 25  0739 25  0652 04/15/25  1740 04/15/25  1345 04/15/25  0826 04/15/25  0737 25  1727 25  1147   NA  --   --   --  141  --   --   --  143  --  142   POTASSIUM 3.6  --   --  3.3*  --  3.6  --  3.3*  3.3*   < > 2.7*   CHLORIDE  --   --   --  102  --   --   --  102  --  99   CO2  --   --   --  33*  --   --   --  35*  --  35*   BUN  --   --   --  9.2  --   --   --  9.3  --  9.6   CR  --   --   --  0.79  --   --   --  0.78  --  0.76   ANIONGAP  --   --   --  6*  --   --   --  6*  --  8   EDILIA  --   --   --  8.4*  --   --   --  8.3*  --  8.6*   GLC  --  204* 156* 161*   < >  --    < > 161*   < > 191*    < > = values in this interval not displayed.   ,   Results for orders placed or performed during the hospital encounter of 25   XR Chest 2 Views    Narrative    EXAM: XR CHEST 2 VIEWS  LOCATION: [facility]  DATE: 2025    INDICATION: left chest pain  COMPARISON: Chest x-rays, most recently 2024. CT dated 2018.      Impression    IMPRESSION: Shallow inspiration. Otherwise negative chest.   Echocardiogram Complete     Value    LVEF  60%    Narrative    [id number]  [id number]  [id number]  [id number]^LINA^SEGUN     [facility]  Echocardiography Laboratory  [address] [place] [id number]     Name: SHERRI MARTINEZ  MRN: [id number]  : 1955  Study Date: 04/15/2025 09:27 AM  Age: 69 yrs  Gender: Female  Patient Location: [facility]  Reason For Study: Chest Pain  Ordering Physician: LINA RAMÍREZ  Referring Physician: valentin Rubio  Performed By: Panda Burroughs     BSA: 2.1 m2  Height: 67 in  Weight: 225 lb  HR: 80  BP: 122/72 mmHg  ______________________________________________________________________________  Procedure  Echocardiogram with two-dimensional, color and spectral Doppler. Definity (NDC  #[id number]) given  intravenously.  ______________________________________________________________________________  Interpretation Summary     1. The left ventricle is normal in size. The visual ejection fraction is  estimated at 60%. Limited views of LV even with contrast. No major areas of  hypokinesis, but sensitivity decreased due to image quality.  2. The right ventricle is normal in structure, function and size.  3. No valve disease.     No previous echo for comparison.  ______________________________________________________________________________  Left Ventricle  The left ventricle is normal in size. There is borderline concentric left  ventricular hypertrophy. The visual ejection fraction is estimated at 60%.  Left ventricular diastolic function is indeterminate. Limited views of LV even  with contrast. No major areas of hypokinesis, but sensitivity decreased due to  image quality.     Right Ventricle  The right ventricle is normal in structure, function and size.     Atria  The left atrium is mildly dilated. Right atrial size is normal. There is no  atrial shunt seen.     Mitral Valve  The mitral valve is normal in structure and function.     Tricuspid Valve  The tricuspid valve is normal in structure and function.     Aortic Valve  The aortic valve is normal in structure and function.     Pulmonic Valve  The pulmonic valve is not well visualized.     Vessels  Normal ascending, transverse (arch), and descending aorta. Inferior vena cava  not well visualized for estimation of right atrial pressure.     Pericardium  There is no pericardial effusion.     Rhythm  Sinus rhythm was noted.  ______________________________________________________________________________  MMode/2D Measurements & Calculations  IVSd: 1.2 cm     LVIDd: 4.9 cm  LVIDs: 3.3 cm  LVPWd: 1.1 cm  FS: 33.3 %  LV mass(C)d: 213.4 grams  LV mass(C)dI: 100.4 grams/m2  Ao root diam: 3.0 cm  asc Aorta Diam: 3.4 cm  LVOT diam: 2.0 cm  LVOT area: 3.1 cm2  Ao root diam  index Ht(cm/m): 1.8  Ao root diam index BSA (cm/m2): 1.4  Asc Ao diam index BSA (cm/m2): 1.6  Asc Ao diam index Ht(cm/m): 2.0  RV Base: 3.4 cm  RWT: 0.45  TAPSE: 1.1 cm     Doppler Measurements & Calculations  MV E max markel: 52.3 cm/sec  MV A max markel: 61.7 cm/sec  MV E/A: 0.85     MV dec time: 0.20 sec  Ao V2 max: 143.0 cm/sec  Ao max P.0 mmHg  Ao V2 mean: 93.7 cm/sec  Ao mean P.0 mmHg  Ao V2 VTI: 25.2 cm  LINDA(I,D): 2.2 cm2  LINDA(V,D): 2.1 cm2  LV V1 max PG: 3.6 mmHg  LV V1 max: 95.2 cm/sec  LV V1 VTI: 18.0 cm  SV(LVOT): 56.5 ml  SI(LVOT): 26.6 ml/m2  PA acc time: 0.07 sec  AV Markel Ratio (DI): 0.67  LINDA Index (cm2/m2): 1.1  E/E' av.9  Lateral E/e': 3.8  Medial E/e': 8.0  RV S Markel: 12.1 cm/sec     ______________________________________________________________________________  Report approved by: Edwin Chavira MD on 04/15/2025 11:34 AM             Discharge Medications   Discharge Medication List as of 2025  1:26 PM        CONTINUE these medications which have CHANGED    Details   potassium chloride (KLOR-CON) 20 MEQ packet Take 40 mEq by mouth 2 times daily. Mix with liquid and take at 8am and 5pm, Transitional           CONTINUE these medications which have NOT CHANGED    Details   acetaminophen (TYLENOL) 325 MG tablet Take 2 tablets (650 mg) by mouth every 6 hours as needed for fever or pain, No Print Out      albuterol (PROAIR HFA/PROVENTIL HFA/VENTOLIN HFA) 108 (90 Base) MCG/ACT inhaler Inhale 1 puff into the lungs every 6 hours as needed for shortness of breath, Historical      ammonium lactate (LAC-HYDRIN) 12 % external lotion Apply topically daily Apply to legsHistorical      Ascorbic Acid 500 MG CHEW Take 500 mg by mouth daily, Historical      atorvastatin (LIPITOR) 40 MG tablet Take 40 mg by mouth daily, Historical      calcium carbonate (TUMS) 500 MG chewable tablet Take 2 chew tab by mouth every 2 hours as needed for heartburn., Historical      carboxymethylcellulose PF (REFRESH  LIQUIGEL) 1 % ophthalmic gel Place 1 drop into both eyes 4 times daily., Historical      Dextromethorphan-guaiFENesin (MUCINEX DM MAXIMUM STRENGTH)  MG TB12 Take 1 tablet by mouth 2 times daily as needed, Historical      Dimethicone 5 % CREA Apply topically daily as needed Apply to bottom for skin irritation or reddened skinHistorical      FLUoxetine (PROZAC) 10 MG capsule Take 10 mg by mouth every morning Give with 20mg to equal 30mg, Historical      FLUoxetine 20 MG tablet Take 20 mg by mouth every morning Give with 10mg to equal 30mg, Historical      fluticasone (FLONASE) 50 MCG/ACT nasal spray Spray 1 spray into both nostrils every morning, Historical      fluticasone-salmeterol (ADVAIR) 500-50 MCG/ACT inhaler Inhale 1 puff into the lungs every 12 hours., Historical      !! hydrOXYzine (ATARAX) 50 MG tablet Take 50 mg by mouth every 6 hours as needed for anxiety, Historical      !! hydrOXYzine HCl (ATARAX) 50 MG tablet Take 50 mg by mouth at bedtime., Historical      loperamide (IMODIUM) 2 MG capsule Take 1 capsule (2 mg) by mouth 4 times daily as needed for diarrhea, Disp-10 capsule, R-0, E-Prescribe      menthol-zinc oxide (CALMOSEPTINE) 0.44-20.6 % OINT ointment Apply topically 2 times daily as needed for skin protectionHistorical      metFORMIN (GLUCOPHAGE) 500 MG tablet Take 2,000 mg by mouth daily (with breakfast)., Historical      multivitamin w/minerals (THERA-VIT-M) tablet Take 1 tablet by mouth daily., Historical      mupirocin (BACTROBAN) 2 % external ointment Apply topically every evening. Under breastsHistorical      nystatin (MYCOSTATIN) 845948 UNIT/GM external powder Apply topically daily as needed Apply to under breasts, yeasty skin foldsNo Print Out      Psyllium 33 % POWD Take 1 packet by mouth 2 times daily (with meals) Mix 3.4grams/1 packet with liquid and take at 8am and 5pm, Historical      sodium chloride (OCEAN) 0.65 % nasal spray Spray 1 spray into both nostrils daily as needed  for congestion, Historical      terbinafine (LAMISIL) 1 % external cream Apply topically every morning. Under breastsHistorical      Vitamin D3 (CHOLECALCIFEROL) 125 MCG (5000 UT) tablet Take 1 tablet by mouth daily, Historical       !! - Potential duplicate medications found. Please discuss with provider.        Allergies   Allergies   Allergen Reactions    Bupropion Anaphylaxis    Sulfa Antibiotics

## 2025-05-10 ENCOUNTER — LAB REQUISITION (OUTPATIENT)
Dept: LAB | Facility: CLINIC | Age: 70
End: 2025-05-10
Payer: COMMERCIAL

## 2025-05-10 DIAGNOSIS — Z13.29 ENCOUNTER FOR SCREENING FOR OTHER SUSPECTED ENDOCRINE DISORDER: ICD-10-CM

## 2025-05-10 DIAGNOSIS — Z09 ENCOUNTER FOR FOLLOW-UP EXAMINATION AFTER COMPLETED TREATMENT FOR CONDITIONS OTHER THAN MALIGNANT NEOPLASM: ICD-10-CM

## 2025-05-10 DIAGNOSIS — E83.2 DISORDERS OF ZINC METABOLISM: ICD-10-CM

## 2025-05-10 DIAGNOSIS — E78.6 LIPOPROTEIN DEFICIENCY: ICD-10-CM

## 2025-05-10 LAB
ANION GAP SERPL CALCULATED.3IONS-SCNC: 9 MMOL/L (ref 7–15)
BUN SERPL-MCNC: 11.3 MG/DL (ref 8–23)
CALCIUM SERPL-MCNC: 8.9 MG/DL (ref 8.8–10.4)
CHLORIDE SERPL-SCNC: 105 MMOL/L (ref 98–107)
CREAT SERPL-MCNC: 0.81 MG/DL (ref 0.51–0.95)
EGFRCR SERPLBLD CKD-EPI 2021: 79 ML/MIN/1.73M2
GLUCOSE SERPL-MCNC: 160 MG/DL (ref 70–99)
HCO3 SERPL-SCNC: 30 MMOL/L (ref 22–29)
MAGNESIUM SERPL-MCNC: 1.5 MG/DL (ref 1.7–2.3)
POTASSIUM SERPL-SCNC: 3.7 MMOL/L (ref 3.4–5.3)
SODIUM SERPL-SCNC: 144 MMOL/L (ref 135–145)
TSH SERPL DL<=0.005 MIU/L-ACNC: 2.47 UIU/ML (ref 0.3–4.2)

## 2025-05-10 PROCEDURE — 84443 ASSAY THYROID STIM HORMONE: CPT | Mod: ORL | Performed by: PHYSICIAN ASSISTANT

## 2025-05-10 PROCEDURE — 80048 BASIC METABOLIC PNL TOTAL CA: CPT | Mod: ORL | Performed by: PHYSICIAN ASSISTANT

## 2025-05-10 PROCEDURE — 83735 ASSAY OF MAGNESIUM: CPT | Mod: ORL | Performed by: PHYSICIAN ASSISTANT

## 2025-05-30 ENCOUNTER — LAB REQUISITION (OUTPATIENT)
Dept: LAB | Facility: CLINIC | Age: 70
End: 2025-05-30
Payer: COMMERCIAL

## 2025-05-30 DIAGNOSIS — K94.29 OTHER COMPLICATIONS OF GASTROSTOMY (H): ICD-10-CM

## 2025-05-30 DIAGNOSIS — L08.9 LOCAL INFECTION OF THE SKIN AND SUBCUTANEOUS TISSUE, UNSPECIFIED: ICD-10-CM

## 2025-05-30 PROCEDURE — 87070 CULTURE OTHR SPECIMN AEROBIC: CPT | Mod: ORL

## 2025-06-02 LAB
BACTERIA SKIN AEROBE CULT: ABNORMAL

## 2025-06-13 ENCOUNTER — LAB REQUISITION (OUTPATIENT)
Dept: LAB | Facility: CLINIC | Age: 70
End: 2025-06-13
Payer: COMMERCIAL

## 2025-06-13 DIAGNOSIS — E87.6 HYPOKALEMIA: ICD-10-CM

## 2025-06-13 LAB — POTASSIUM SERPL-SCNC: 2.6 MMOL/L (ref 3.4–5.3)

## 2025-06-13 PROCEDURE — 84132 ASSAY OF SERUM POTASSIUM: CPT | Mod: ORL | Performed by: PHYSICIAN ASSISTANT

## 2025-06-20 ENCOUNTER — LAB REQUISITION (OUTPATIENT)
Dept: LAB | Facility: CLINIC | Age: 70
End: 2025-06-20
Payer: COMMERCIAL

## 2025-06-20 DIAGNOSIS — E87.6 HYPOKALEMIA: ICD-10-CM

## 2025-06-20 LAB — POTASSIUM SERPL-SCNC: 4.5 MMOL/L (ref 3.4–5.3)

## 2025-06-20 PROCEDURE — 84132 ASSAY OF SERUM POTASSIUM: CPT | Mod: ORL

## 2025-06-25 ENCOUNTER — LAB REQUISITION (OUTPATIENT)
Dept: LAB | Facility: CLINIC | Age: 70
End: 2025-06-25
Payer: COMMERCIAL

## 2025-06-25 DIAGNOSIS — R46.89 OTHER SYMPTOMS AND SIGNS INVOLVING APPEARANCE AND BEHAVIOR: ICD-10-CM

## 2025-06-25 DIAGNOSIS — J44.9 CHRONIC OBSTRUCTIVE PULMONARY DISEASE, UNSPECIFIED (H): ICD-10-CM

## 2025-06-25 DIAGNOSIS — E87.6 HYPOKALEMIA: ICD-10-CM

## 2025-06-25 DIAGNOSIS — E11.9 TYPE 2 DIABETES MELLITUS WITHOUT COMPLICATIONS (H): ICD-10-CM

## 2025-06-25 DIAGNOSIS — E83.42 HYPOMAGNESEMIA: ICD-10-CM

## 2025-06-25 LAB
ALBUMIN SERPL BCG-MCNC: 3.2 G/DL (ref 3.5–5.2)
ALP SERPL-CCNC: 95 U/L (ref 40–150)
ALT SERPL W P-5'-P-CCNC: 14 U/L (ref 0–50)
ANION GAP SERPL CALCULATED.3IONS-SCNC: 10 MMOL/L (ref 7–15)
AST SERPL W P-5'-P-CCNC: 19 U/L (ref 0–45)
BILIRUB SERPL-MCNC: 0.4 MG/DL
BUN SERPL-MCNC: 8.1 MG/DL (ref 8–23)
CALCIUM SERPL-MCNC: 9.5 MG/DL (ref 8.8–10.4)
CHLORIDE SERPL-SCNC: 108 MMOL/L (ref 98–107)
CREAT SERPL-MCNC: 0.99 MG/DL (ref 0.51–0.95)
EGFRCR SERPLBLD CKD-EPI 2021: 62 ML/MIN/1.73M2
ERYTHROCYTE [DISTWIDTH] IN BLOOD BY AUTOMATED COUNT: 15 % (ref 10–15)
EST. AVERAGE GLUCOSE BLD GHB EST-MCNC: 140 MG/DL
GLUCOSE SERPL-MCNC: 141 MG/DL (ref 70–99)
HBA1C MFR BLD: 6.5 %
HCO3 SERPL-SCNC: 19 MMOL/L (ref 22–29)
HCT VFR BLD AUTO: 43.9 % (ref 35–47)
HGB BLD-MCNC: 13.2 G/DL (ref 11.7–15.7)
MAGNESIUM SERPL-MCNC: 1.8 MG/DL (ref 1.7–2.3)
MCH RBC QN AUTO: 27.8 PG (ref 26.5–33)
MCHC RBC AUTO-ENTMCNC: 30.1 G/DL (ref 31.5–36.5)
MCV RBC AUTO: 93 FL (ref 78–100)
PHOSPHATE SERPL-MCNC: 3.7 MG/DL (ref 2.5–4.5)
PLATELET # BLD AUTO: 218 10E3/UL (ref 150–450)
POTASSIUM SERPL-SCNC: 5.5 MMOL/L (ref 3.4–5.3)
PROT SERPL-MCNC: 6.6 G/DL (ref 6.4–8.3)
RBC # BLD AUTO: 4.74 10E6/UL (ref 3.8–5.2)
SODIUM SERPL-SCNC: 137 MMOL/L (ref 135–145)
WBC # BLD AUTO: 7.4 10E3/UL (ref 4–11)

## 2025-06-25 PROCEDURE — 84100 ASSAY OF PHOSPHORUS: CPT | Mod: ORL

## 2025-06-25 PROCEDURE — 80053 COMPREHEN METABOLIC PANEL: CPT | Mod: ORL

## 2025-06-25 PROCEDURE — 83036 HEMOGLOBIN GLYCOSYLATED A1C: CPT | Mod: ORL

## 2025-06-25 PROCEDURE — 83735 ASSAY OF MAGNESIUM: CPT | Mod: ORL

## 2025-06-25 PROCEDURE — 85027 COMPLETE CBC AUTOMATED: CPT | Mod: ORL

## 2025-06-25 PROCEDURE — 82247 BILIRUBIN TOTAL: CPT | Mod: ORL

## 2025-07-04 ENCOUNTER — LAB REQUISITION (OUTPATIENT)
Dept: LAB | Facility: CLINIC | Age: 70
End: 2025-07-04
Payer: COMMERCIAL

## 2025-07-04 DIAGNOSIS — E87.5 HYPERKALEMIA: ICD-10-CM

## 2025-07-04 LAB — POTASSIUM SERPL-SCNC: 4.5 MMOL/L (ref 3.4–5.3)

## 2025-07-04 PROCEDURE — 84132 ASSAY OF SERUM POTASSIUM: CPT | Mod: ORL
